# Patient Record
Sex: FEMALE | Race: ASIAN | NOT HISPANIC OR LATINO | URBAN - METROPOLITAN AREA
[De-identification: names, ages, dates, MRNs, and addresses within clinical notes are randomized per-mention and may not be internally consistent; named-entity substitution may affect disease eponyms.]

---

## 2018-04-24 ENCOUNTER — EMERGENCY (EMERGENCY)
Facility: HOSPITAL | Age: 33
LOS: 1 days | Discharge: HOME | End: 2018-04-24
Attending: EMERGENCY MEDICINE | Admitting: EMERGENCY MEDICINE
Payer: COMMERCIAL

## 2018-04-24 VITALS
HEART RATE: 75 BPM | OXYGEN SATURATION: 98 % | RESPIRATION RATE: 18 BRPM | SYSTOLIC BLOOD PRESSURE: 124 MMHG | WEIGHT: 125 LBS | TEMPERATURE: 98 F | DIASTOLIC BLOOD PRESSURE: 79 MMHG
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
## 2018-04-24 DIAGNOSIS — Z23 ENCOUNTER FOR IMMUNIZATION: ICD-10-CM

## 2018-04-24 DIAGNOSIS — S81.851A OPEN BITE, RIGHT LOWER LEG, INITIAL ENCOUNTER: ICD-10-CM
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
## 2018-04-24 DIAGNOSIS — Z20.3 CONTACT WITH AND (SUSPECTED) EXPOSURE TO RABIES: ICD-10-CM

## 2018-04-24 PROCEDURE — 99283 EMERGENCY DEPT VISIT LOW MDM: CPT

## 2018-04-24 RX ORDER — TETANUS TOXOID, REDUCED DIPHTHERIA TOXOID AND ACELLULAR PERTUSSIS VACCINE, ADSORBED 5; 2.5; 8; 8; 2.5 [IU]/.5ML; [IU]/.5ML; UG/.5ML; UG/.5ML; UG/.5ML
0.5 SUSPENSION INTRAMUSCULAR ONCE
Qty: 0 | Refills: 0 | Status: COMPLETED | OUTPATIENT
Start: 2018-04-24 | End: 2018-04-24

## 2018-04-24 RX ORDER — RABIES VACC, HUMAN DIPLOID/PF 2.5 UNIT
1 VIAL (EA) INTRAMUSCULAR ONCE
Qty: 0 | Refills: 0 | Status: COMPLETED | OUTPATIENT
Start: 2018-04-24 | End: 2018-04-24

## 2018-04-24 RX ORDER — TETANUS AND DIPHTHERIA TOXOIDS ADSORBED 2; 2 [LF]/.5ML; [LF]/.5ML
0.5 INJECTION INTRAMUSCULAR ONCE
Qty: 0 | Refills: 0 | Status: DISCONTINUED | OUTPATIENT
Start: 2018-04-24 | End: 2018-04-24

## 2018-04-24 RX ORDER — HUMAN RABIES VIRUS IMMUNE GLOBULIN 150 [IU]/ML
1100 INJECTION, SOLUTION INTRAMUSCULAR ONCE
Qty: 0 | Refills: 0 | Status: COMPLETED | OUTPATIENT
Start: 2018-04-24 | End: 2018-04-24

## 2018-04-24 RX ADMIN — Medication 1 MILLILITER(S): at 14:47

## 2018-04-24 RX ADMIN — HUMAN RABIES VIRUS IMMUNE GLOBULIN 1100 UNIT(S): 150 INJECTION, SOLUTION INTRAMUSCULAR at 14:47

## 2018-04-24 NOTE — ED PROVIDER NOTE - MUSCULOSKELETAL, MLM
Spine appears normal, range of motion is not limited, no muscle or joint tenderness RLE: R lower medial proximal calf w/ 2 pncture wounds, no overlying cellulitis, no hematoma, no crepitus, otherwise skin intact, knee w/ FROM.

## 2018-04-24 NOTE — ED PROVIDER NOTE - MEDICAL DECISION MAKING DETAILS
Tetanus is up to date, rabies immunoglobulin w/ rabies vaccine with f/u visits in 3, 7, and 14 days.

## 2018-04-24 NOTE — ED PROVIDER NOTE - OBJECTIVE STATEMENT
34 y/o F w/ no PMH, ANTHONY, who was at the dog park with her dog, and her dog was playing with another large dog when the dog ran into her R lower leg. Patient did not have pain at the time and walked home with her dog, and at home, patient noticed stingy pain and pulled up her jeans and noticed a bite marti to her R calf, there were rips in her jeans. Patient believes that when the dog may have bit her accidently while playing with her dog. She did not think to obtain the dog's owners ID or information on the canine as the patient did not notice any injury at the time. And because she does not know the dog's immunization records, patient is here requesting rabies vaccine. Patient denies bony pain to the area, bleeding from the wound, numbness/tingling or weakness to the RLE. She is originally from [place] and has been living in [place] for 5 years and had her immunization up to date including tetanus. Patient primarily speaks Mandarin. 34 y/o F w/ no PMH, ANTHONY, who was at the dog park with her dog, and her dog was playing with another large dog when the dog ran into her R lower leg. Patient did not have pain at the time and walked home with her dog, and at home, patient noticed stingy pain and pulled up her jeans and noticed a bite marti to her R calf, there were rips in her jeans. Patient believes that when the dog may have bit her accidently while playing with her dog. She did not think to obtain the dog's owners ID or information on the canine as the patient did not notice any injury at the time. And because she does not know the dog's immunization records, patient presents here with dog bite to R calf and is requesting rabies vaccine. Patient denies bony pain to the area, bleeding from the wound, numbness/tingling or weakness to the RLE. She is originally from [place] and has been living in [place] for 5 years and had her immunization up to date including tetanus. Patient primarily speaks Mandarin. 32 y/o F w/ no PMH, ANTHONY, who was at the dog park with her dog last night, and her dog was playing with another large dog when the dog ran into her R lower leg. Patient did not have pain at the time and walked home with her dog, and at home, patient noticed stingy pain and pulled up her jeans and noticed a bite marti to her R calf, there were rips in her jeans. Patient believes that when the dog may have bit her accidently while playing with her dog. She did not think to obtain the dog's owners ID or information on the canine as the patient did not notice any injury at the time. And because she does not know the dog's immunization records, patient presents here with dog bite to R calf and is requesting rabies vaccine. Patient denies bony pain to the area, bleeding from the wound, numbness/tingling or weakness to the RLE. She is originally from [place] and has been living in [place] for 5 years and had her immunization up to date including tetanus. Patient primarily speaks Mandarin. 34 y/o F w/ no PMH, ANTHONY, who was at the dog park with her dog last night, and her dog was playing with another large dog when the dog ran into her R lower leg. Patient did not have pain at the time and walked home with her dog, and at home, patient noticed stingy pain and pulled up her jeans and noticed a bite marti to her R calf, there were rips in her jeans. Patient believes that when the dog may have bit her accidently while playing with her dog. She did not think to obtain the dog's owners ID or information on the canine as the patient did not notice any injury at the time. And because she does not know the dog's immunization records, patient presents here with dog bite to R calf and is requesting rabies vaccine. Patient denies bony pain to the area, bleeding from the wound, numbness/tingling or weakness to the RLE. She is originally from [place] and has been living in [place] for 5 years and had her immunization up to date, last tetanus 2 years ago. Patient primarily speaks Mandarin.

## 2018-04-27 ENCOUNTER — EMERGENCY (EMERGENCY)
Facility: HOSPITAL | Age: 33
LOS: 1 days | Discharge: HOME | End: 2018-04-27
Attending: EMERGENCY MEDICINE | Admitting: EMERGENCY MEDICINE
Payer: COMMERCIAL

## 2018-04-27 VITALS
HEART RATE: 68 BPM | RESPIRATION RATE: 18 BRPM | DIASTOLIC BLOOD PRESSURE: 81 MMHG | SYSTOLIC BLOOD PRESSURE: 119 MMHG | OXYGEN SATURATION: 99 % | TEMPERATURE: 98 F

## 2018-04-27 DIAGNOSIS — Z20.3 CONTACT WITH AND (SUSPECTED) EXPOSURE TO RABIES: ICD-10-CM

## 2018-04-27 PROCEDURE — 99282 EMERGENCY DEPT VISIT SF MDM: CPT

## 2018-04-27 RX ORDER — RABIES VACC, HUMAN DIPLOID/PF 2.5 UNIT
1 VIAL (EA) INTRAMUSCULAR ONCE
Qty: 0 | Refills: 0 | Status: COMPLETED | OUTPATIENT
Start: 2018-04-27 | End: 2018-04-27

## 2018-04-27 RX ADMIN — Medication 1 MILLILITER(S): at 09:48

## 2018-04-27 NOTE — ED ADULT NURSE NOTE - CHIEF COMPLAINT QUOTE
pt arrives for 3rd day treatment rabies series pt arrives for 3rd day treatment rabies series for dog bite

## 2018-05-01 ENCOUNTER — EMERGENCY (EMERGENCY)
Facility: HOSPITAL | Age: 33
LOS: 1 days | Discharge: HOME | End: 2018-05-01
Admitting: EMERGENCY MEDICINE
Payer: COMMERCIAL

## 2018-05-01 VITALS
RESPIRATION RATE: 16 BRPM | HEART RATE: 71 BPM | DIASTOLIC BLOOD PRESSURE: 73 MMHG | TEMPERATURE: 98 F | OXYGEN SATURATION: 98 % | SYSTOLIC BLOOD PRESSURE: 107 MMHG

## 2018-05-01 PROCEDURE — 99282 EMERGENCY DEPT VISIT SF MDM: CPT

## 2018-05-01 RX ORDER — RABIES VACC, HUMAN DIPLOID/PF 2.5 UNIT
1 VIAL (EA) INTRAMUSCULAR ONCE
Qty: 0 | Refills: 0 | Status: COMPLETED | OUTPATIENT
Start: 2018-05-01 | End: 2018-05-01

## 2018-05-01 RX ADMIN — Medication 1 MILLILITER(S): at 10:04

## 2018-05-01 NOTE — ED PROVIDER NOTE - OBJECTIVE STATEMENT
32 y/o F w/ no PMH, NKDA, s/p bite to the R calf, here today for 3rd dose of rabies vaccine.  No active complaints.  Denies fever, chills, FB sensation, change in ROM/sensation, redness and swelling

## 2018-05-01 NOTE — ED ADULT NURSE NOTE - OBJECTIVE STATEMENT
pt is a 33 year old female who comes into the ED for her 3rd rabies vaccine. pt denies any symptoms at this time.

## 2018-05-01 NOTE — ED PROVIDER NOTE - PHYSICAL EXAMINATION
Gen - WDWN, NAD, comfortable and non-toxic appearing  Skin - warm, dry, intact   MS - w/w/p, no c/c/e, no CVAT b/l  Neuro - AxOx3, ambulatory without gait disturbance

## 2018-05-05 DIAGNOSIS — Z20.3 CONTACT WITH AND (SUSPECTED) EXPOSURE TO RABIES: ICD-10-CM

## 2018-05-08 ENCOUNTER — EMERGENCY (EMERGENCY)
Facility: HOSPITAL | Age: 33
LOS: 1 days | Discharge: HOME | End: 2018-05-08
Attending: EMERGENCY MEDICINE | Admitting: EMERGENCY MEDICINE
Payer: COMMERCIAL

## 2018-05-08 VITALS
DIASTOLIC BLOOD PRESSURE: 63 MMHG | HEART RATE: 62 BPM | SYSTOLIC BLOOD PRESSURE: 112 MMHG | TEMPERATURE: 98 F | RESPIRATION RATE: 17 BRPM | OXYGEN SATURATION: 96 %

## 2018-05-08 VITALS
HEART RATE: 59 BPM | WEIGHT: 138.01 LBS | SYSTOLIC BLOOD PRESSURE: 109 MMHG | RESPIRATION RATE: 17 BRPM | TEMPERATURE: 98 F | OXYGEN SATURATION: 97 % | DIASTOLIC BLOOD PRESSURE: 65 MMHG

## 2018-05-08 DIAGNOSIS — Z20.3 CONTACT WITH AND (SUSPECTED) EXPOSURE TO RABIES: ICD-10-CM

## 2018-05-08 PROCEDURE — 99282 EMERGENCY DEPT VISIT SF MDM: CPT

## 2018-05-08 RX ORDER — RABIES VACC, HUMAN DIPLOID/PF 2.5 UNIT
1 VIAL (EA) INTRAMUSCULAR ONCE
Qty: 0 | Refills: 0 | Status: COMPLETED | OUTPATIENT
Start: 2018-05-08 | End: 2018-05-08

## 2018-05-08 RX ADMIN — Medication 1 MILLILITER(S): at 11:22

## 2018-05-08 NOTE — ED ADULT NURSE NOTE - OBJECTIVE STATEMENT
pt is a 33 year old female who comes into the ED for her 4rd rabies vaccine. pt denies any symptoms at this time.

## 2018-05-08 NOTE — ED PROVIDER NOTE - MEDICAL DECISION MAKING DETAILS
given last dose of rabies vaccine.  Wound appears healed.  Conservative management discussed with the patient in detail.  Close PMD follow up encouraged.  Strict ED return instructions discussed in detail and patient given the opportunity to ask any questions about their discharge diagnosis and instructions

## 2018-05-08 NOTE — ED PROVIDER NOTE - OBJECTIVE STATEMENT
Patient is two weeks s/p dog bite.  Treated initially with rabies IG and vaccine and here for her last vaccine.  Denies pain at the site of the original dog bite.  Denies chest pain, cough, fever,

## 2018-08-28 ENCOUNTER — TRANSCRIPTION ENCOUNTER (OUTPATIENT)
Age: 33
End: 2018-08-28

## 2020-04-22 PROBLEM — Z00.00 ENCOUNTER FOR PREVENTIVE HEALTH EXAMINATION: Status: ACTIVE | Noted: 2020-04-22

## 2020-04-27 ENCOUNTER — APPOINTMENT (OUTPATIENT)
Dept: OBGYN | Facility: CLINIC | Age: 35
End: 2020-04-27
Payer: COMMERCIAL

## 2020-04-27 ENCOUNTER — TRANSCRIPTION ENCOUNTER (OUTPATIENT)
Age: 35
End: 2020-04-27

## 2020-04-27 ENCOUNTER — ASOB RESULT (OUTPATIENT)
Age: 35
End: 2020-04-27

## 2020-04-27 VITALS
DIASTOLIC BLOOD PRESSURE: 81 MMHG | HEIGHT: 63 IN | SYSTOLIC BLOOD PRESSURE: 114 MMHG | HEART RATE: 73 BPM | WEIGHT: 132.44 LBS | BODY MASS INDEX: 23.46 KG/M2 | TEMPERATURE: 98.2 F

## 2020-04-27 DIAGNOSIS — D12.6 BENIGN NEOPLASM OF COLON, UNSPECIFIED: ICD-10-CM

## 2020-04-27 DIAGNOSIS — Z83.71 FAMILY HISTORY OF COLONIC POLYPS: ICD-10-CM

## 2020-04-27 DIAGNOSIS — Z80.0 FAMILY HISTORY OF MALIGNANT NEOPLASM OF DIGESTIVE ORGANS: ICD-10-CM

## 2020-04-27 DIAGNOSIS — Z82.69 FAMILY HISTORY OF OTHER DISEASES OF THE MUSCULOSKELETAL SYSTEM AND CONNECTIVE TISSUE: ICD-10-CM

## 2020-04-27 LAB
HCG UR QL: POSITIVE
QUALITY CONTROL: YES

## 2020-04-27 PROCEDURE — 76817 TRANSVAGINAL US OBSTETRIC: CPT

## 2020-04-27 PROCEDURE — 99203 OFFICE O/P NEW LOW 30 MIN: CPT

## 2020-04-27 RX ORDER — ELASTIC BANDAGE 2"X2.2YD
BANDAGE TOPICAL
Refills: 0 | Status: ACTIVE | COMMUNITY

## 2020-04-27 NOTE — CHIEF COMPLAINT
[Initial Visit] : initial GYN visit [FreeTextEntry1] : Patient initially made appointment as NPN but started bleeding heavily on Saturday and has continued bleeding. UCg in office today is +.\par Patient under went IVF in 2/25 with egg retrieval but no embryos developed that could be transferred. 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 Since embryos did not develop, she decided to attempt pregnancy spontaneously and had positive pregnancy test on 4/14 but then started bleeding heavily two days ago and sonogram today shows no IUP.

## 2020-04-27 NOTE — PHYSICAL EXAM
[Awake] : awake [Acute Distress] : no acute distress [Alert] : alert [LAD] : no lymphadenopathy [Goiter] : no goiter [Thyroid Nodule] : no thyroid nodule [Mass] : no breast mass [Nipple Discharge] : no nipple discharge [Axillary LAD] : no axillary lymphadenopathy [Soft] : soft [Tender] : non tender [Oriented x3] : oriented to person, place, and time [Normal] : uterus [Uterine Adnexae] : were not tender and not enlarged [Moderate] : there was moderate vaginal bleeding

## 2020-04-27 NOTE — HISTORY OF PRESENT ILLNESS
[Approximately ___ (Month)] : the LMP was approximately [unfilled] month(s) ago [Regular Cycle Intervals] : periods have been regular

## 2020-04-30 LAB
ABO + RH PNL BLD: NORMAL
BASOPHILS # BLD AUTO: 0.05 K/UL
BASOPHILS NFR BLD AUTO: 0.6 %
BLD GP AB SCN SERPL QL: NORMAL
EOSINOPHIL # BLD AUTO: 0.25 K/UL
EOSINOPHIL NFR BLD AUTO: 3 %
HCG SERPL-MCNC: 1247 MIU/ML
HCT VFR BLD CALC: 40.7 %
HGB BLD-MCNC: 13.1 G/DL
IMM GRANULOCYTES NFR BLD AUTO: 0.2 %
LYMPHOCYTES # BLD AUTO: 1.96 K/UL
LYMPHOCYTES NFR BLD AUTO: 23.5 %
MAN DIFF?: NORMAL
MCHC RBC-ENTMCNC: 29.8 PG
MCHC RBC-ENTMCNC: 32.2 GM/DL
MCV RBC AUTO: 92.5 FL
MONOCYTES # BLD AUTO: 0.48 K/UL
MONOCYTES NFR BLD AUTO: 5.8 %
NEUTROPHILS # BLD AUTO: 5.58 K/UL
NEUTROPHILS NFR BLD AUTO: 66.9 %
PLATELET # BLD AUTO: 253 K/UL
RBC # BLD: 4.4 M/UL
RBC # FLD: 13.2 %
WBC # FLD AUTO: 8.34 K/UL

## 2020-05-06 LAB — HCG SERPL-MCNC: 38 MIU/ML

## 2020-06-12 ENCOUNTER — APPOINTMENT (OUTPATIENT)
Dept: OBGYN | Facility: CLINIC | Age: 35
End: 2020-06-12

## 2020-09-09 ENCOUNTER — APPOINTMENT (OUTPATIENT)
Dept: OPHTHALMOLOGY | Facility: CLINIC | Age: 35
End: 2020-09-09

## 2020-09-18 ENCOUNTER — APPOINTMENT (OUTPATIENT)
Dept: OTOLARYNGOLOGY | Facility: CLINIC | Age: 35
End: 2020-09-18
Payer: COMMERCIAL

## 2020-09-18 VITALS — BODY MASS INDEX: 21.62 KG/M2 | HEIGHT: 63 IN | WEIGHT: 122 LBS | TEMPERATURE: 98 F

## 2020-09-18 DIAGNOSIS — J30.0 VASOMOTOR RHINITIS: ICD-10-CM

## 2020-09-18 DIAGNOSIS — J32.4 CHRONIC PANSINUSITIS: ICD-10-CM

## 2020-09-18 DIAGNOSIS — Z82.49 FAMILY HISTORY OF ISCHEMIC HEART DISEASE AND OTHER DISEASES OF THE CIRCULATORY SYSTEM: ICD-10-CM

## 2020-09-18 DIAGNOSIS — Z80.9 FAMILY HISTORY OF MALIGNANT NEOPLASM, UNSPECIFIED: ICD-10-CM

## 2020-09-18 DIAGNOSIS — J30.2 OTHER SEASONAL ALLERGIC RHINITIS: ICD-10-CM

## 2020-09-18 PROCEDURE — 31231 NASAL ENDOSCOPY DX: CPT

## 2020-09-18 PROCEDURE — 69210 REMOVE IMPACTED EAR WAX UNI: CPT

## 2020-09-18 PROCEDURE — 99204 OFFICE O/P NEW MOD 45 MIN: CPT | Mod: 25

## 2020-09-18 NOTE — HISTORY OF PRESENT ILLNESS
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

## 2020-09-18 NOTE — PROCEDURE
[FreeTextEntry3] : Procedure- removal of cerumen bilaterally\par Diagnosis - cerumen impaction\par bilateral ears found to have impacted cerumen - they were cleared with suction and curette, canals appeared normal.\par  [FreeTextEntry6] : Procedure performed: Nasal Endoscopy- Diagnostic\par Pre-op indication(s): nasal congestion\par Post-op indication(s): nasal congestion \par Verbal and/or written consent obtained from patient\par Anterior rhinoscopy insufficient to account for symptoms\par Scope #: 3,  flexible fiber optic telescope \par The scope was introduced in the nasal passage between the middle and inferior turbinates to exam the inferior portion of the middle meatus and the fontanelle, as well as the maxillary ostia.  Next, the scope was passed medically and posteriorly to the middle turbinates to examine the sphenoethmoid recess and the superior turbinate region.\par Upon visualization the finders are as follows:\par Nasal Septum: sigmoidal septal deviation\par Bilateral - Mucosa: boggy turbinates, Mucous: scant, Polyp: not seen, Inferior Turbinate: boggy, Middle Turbinate: normal, Superior Turbinate: normal, Inferior Meatus: narrow, Middle Meatus: narrow, Super Meatus:normal, Sphenoethmoidal Recess: clear\par

## 2020-09-18 NOTE — REVIEW OF SYSTEMS
[Sneezing] : sneezing [Seasonal Allergies] : seasonal allergies [Ear Pain] : ear pain [Ear Itch] : ear itch [Hearing Loss] : hearing loss [Nasal Congestion] : nasal congestion [Sense Of Smell Problem] : sense of smell problem [Negative] : Heme/Lymph [FreeTextEntry1] : fatigue

## 2020-09-18 NOTE — REASON FOR VISIT
[Initial Consultation] : an initial consultation for [FreeTextEntry2] : allergy symptoms worsen through years

## 2020-09-18 NOTE — ASSESSMENT
[FreeTextEntry1] : pt with chronic sinusitis with most prominent sx being d/c, sneezinga nd pressure. rather benign appearing nasa exam. history neg allergy test \par We will proceed with a regiment of decongestants, antibiotics and irrigation to try to break the cycle of inflation and obstruction. this will treat the acute symptoms and will hopefully allow for maintenance therapy to reach disease areas and avoid further intervention.\par atrovent for d/c\par \par Hearing loss - likely conductive resolved after disimpaction \par if persists will cosndier audio\par ear hygiene\par discussed preventive measures and signs of accumulation\par

## 2020-12-17 ENCOUNTER — RX RENEWAL (OUTPATIENT)
Age: 35
End: 2020-12-17

## 2020-12-24 ENCOUNTER — NON-APPOINTMENT (OUTPATIENT)
Age: 35
End: 2020-12-24

## 2020-12-24 ENCOUNTER — ASOB RESULT (OUTPATIENT)
Age: 35
End: 2020-12-24

## 2020-12-24 ENCOUNTER — APPOINTMENT (OUTPATIENT)
Dept: OBGYN | Facility: CLINIC | Age: 35
End: 2020-12-24
Payer: COMMERCIAL

## 2020-12-24 PROCEDURE — 99072 ADDL SUPL MATRL&STAF TM PHE: CPT

## 2020-12-24 PROCEDURE — 76817 TRANSVAGINAL US OBSTETRIC: CPT

## 2020-12-28 ENCOUNTER — APPOINTMENT (OUTPATIENT)
Dept: OBGYN | Facility: CLINIC | Age: 35
End: 2020-12-28
Payer: COMMERCIAL

## 2020-12-28 ENCOUNTER — ASOB RESULT (OUTPATIENT)
Age: 35
End: 2020-12-28

## 2020-12-28 VITALS
HEART RATE: 76 BPM | SYSTOLIC BLOOD PRESSURE: 116 MMHG | BODY MASS INDEX: 22.32 KG/M2 | DIASTOLIC BLOOD PRESSURE: 76 MMHG | TEMPERATURE: 98.3 F | WEIGHT: 126 LBS | HEIGHT: 63 IN

## 2020-12-28 DIAGNOSIS — Z87.59 PERSONAL HISTORY OF OTHER COMPLICATIONS OF PREGNANCY, CHILDBIRTH AND THE PUERPERIUM: ICD-10-CM

## 2020-12-28 PROCEDURE — 99072 ADDL SUPL MATRL&STAF TM PHE: CPT

## 2020-12-28 PROCEDURE — 99214 OFFICE O/P EST MOD 30 MIN: CPT

## 2020-12-28 RX ORDER — PREDNISONE 10 MG/1
10 TABLET ORAL
Qty: 27 | Refills: 0 | Status: DISCONTINUED | COMMUNITY
Start: 2020-09-18 | End: 2020-12-28

## 2020-12-28 RX ORDER — AMOXICILLIN AND CLAVULANATE POTASSIUM 875; 125 MG/1; MG/1
875-125 TABLET, COATED ORAL TWICE DAILY
Qty: 28 | Refills: 0 | Status: DISCONTINUED | COMMUNITY
Start: 2020-09-18 | End: 2020-12-28

## 2020-12-30 LAB
ABO + RH PNL BLD: NORMAL
B19V IGG SER QL IA: 0.4 INDEX
B19V IGG+IGM SER-IMP: NEGATIVE
B19V IGG+IGM SER-IMP: NORMAL
B19V IGM FLD-ACNC: 0.3 INDEX
B19V IGM SER-ACNC: NEGATIVE
BACTERIA UR CULT: NORMAL
BASOPHILS # BLD AUTO: 0.03 K/UL
BASOPHILS NFR BLD AUTO: 0.4 %
BLD GP AB SCN SERPL QL: NORMAL
C TRACH RRNA SPEC QL NAA+PROBE: NOT DETECTED
EOSINOPHIL # BLD AUTO: 0.27 K/UL
EOSINOPHIL NFR BLD AUTO: 3.3 %
HBV SURFACE AG SER QL: NONREACTIVE
HCT VFR BLD CALC: 36.3 %
HCV AB SER QL: NONREACTIVE
HCV S/CO RATIO: 0.22 S/CO
HGB A MFR BLD: 97.5 %
HGB A2 MFR BLD: 2.5 %
HGB BLD-MCNC: 12.2 G/DL
HGB FRACT BLD-IMP: NORMAL
HIV1+2 AB SPEC QL IA.RAPID: NONREACTIVE
HPV HIGH+LOW RISK DNA PNL CVX: NOT DETECTED
IMM GRANULOCYTES NFR BLD AUTO: 0.2 %
LEAD BLD-MCNC: <1 UG/DL
LYMPHOCYTES # BLD AUTO: 1.82 K/UL
LYMPHOCYTES NFR BLD AUTO: 22.4 %
MAN DIFF?: NORMAL
MCHC RBC-ENTMCNC: 31.2 PG
MCHC RBC-ENTMCNC: 33.6 GM/DL
MCV RBC AUTO: 92.8 FL
MEV IGG FLD QL IA: >300 AU/ML
MEV IGG+IGM SER-IMP: POSITIVE
MONOCYTES # BLD AUTO: 0.46 K/UL
MONOCYTES NFR BLD AUTO: 5.7 %
N GONORRHOEA RRNA SPEC QL NAA+PROBE: NOT DETECTED
NEUTROPHILS # BLD AUTO: 5.51 K/UL
NEUTROPHILS NFR BLD AUTO: 68 %
PLATELET # BLD AUTO: 227 K/UL
RBC # BLD: 3.91 M/UL
RBC # FLD: 12.1 %
RUBV IGG FLD-ACNC: 5.6 INDEX
RUBV IGG SER-IMP: POSITIVE
SOURCE AMPLIFICATION: NORMAL
T GONDII AB SER-IMP: NEGATIVE
T GONDII AB SER-IMP: NEGATIVE
T GONDII IGG SER QL: <3 IU/ML
T GONDII IGM SER QL: <3 AU/ML
T PALLIDUM AB SER QL IA: NEGATIVE
VZV AB TITR SER: POSITIVE
VZV IGG SER IF-ACNC: >4000 INDEX
WBC # FLD AUTO: 8.11 K/UL

## 2021-01-06 LAB — CYTOLOGY CVX/VAG DOC THIN PREP: NORMAL

## 2021-01-25 ENCOUNTER — APPOINTMENT (OUTPATIENT)
Dept: OBGYN | Facility: CLINIC | Age: 36
End: 2021-01-25
Payer: COMMERCIAL

## 2021-01-25 ENCOUNTER — NON-APPOINTMENT (OUTPATIENT)
Age: 36
End: 2021-01-25

## 2021-01-25 VITALS
DIASTOLIC BLOOD PRESSURE: 71 MMHG | SYSTOLIC BLOOD PRESSURE: 115 MMHG | WEIGHT: 129.2 LBS | BODY MASS INDEX: 22.89 KG/M2 | HEIGHT: 63 IN

## 2021-01-25 PROCEDURE — 0501F PRENATAL FLOW SHEET: CPT

## 2021-01-28 ENCOUNTER — NON-APPOINTMENT (OUTPATIENT)
Age: 36
End: 2021-01-28

## 2021-02-12 ENCOUNTER — APPOINTMENT (OUTPATIENT)
Dept: ANTEPARTUM | Facility: CLINIC | Age: 36
End: 2021-02-12
Payer: COMMERCIAL

## 2021-02-12 ENCOUNTER — LABORATORY RESULT (OUTPATIENT)
Age: 36
End: 2021-02-12

## 2021-02-12 ENCOUNTER — ASOB RESULT (OUTPATIENT)
Age: 36
End: 2021-02-12

## 2021-02-12 PROCEDURE — 36416 COLLJ CAPILLARY BLOOD SPEC: CPT

## 2021-02-12 PROCEDURE — 76801 OB US < 14 WKS SINGLE FETUS: CPT

## 2021-02-12 PROCEDURE — 99072 ADDL SUPL MATRL&STAF TM PHE: CPT

## 2021-02-12 PROCEDURE — 76813 OB US NUCHAL MEAS 1 GEST: CPT

## 2021-03-01 ENCOUNTER — NON-APPOINTMENT (OUTPATIENT)
Age: 36
End: 2021-03-01

## 2021-03-01 ENCOUNTER — APPOINTMENT (OUTPATIENT)
Dept: OBGYN | Facility: CLINIC | Age: 36
End: 2021-03-01
Payer: COMMERCIAL

## 2021-03-01 ENCOUNTER — LABORATORY RESULT (OUTPATIENT)
Age: 36
End: 2021-03-01

## 2021-03-01 VITALS
TEMPERATURE: 97.3 F | SYSTOLIC BLOOD PRESSURE: 126 MMHG | DIASTOLIC BLOOD PRESSURE: 79 MMHG | HEIGHT: 63 IN | WEIGHT: 136 LBS | BODY MASS INDEX: 24.1 KG/M2

## 2021-03-01 PROCEDURE — 0502F SUBSEQUENT PRENATAL CARE: CPT

## 2021-03-31 ENCOUNTER — NON-APPOINTMENT (OUTPATIENT)
Age: 36
End: 2021-03-31

## 2021-03-31 ENCOUNTER — APPOINTMENT (OUTPATIENT)
Dept: OBGYN | Facility: CLINIC | Age: 36
End: 2021-03-31
Payer: COMMERCIAL

## 2021-03-31 VITALS
TEMPERATURE: 97.2 F | WEIGHT: 143.06 LBS | HEIGHT: 63 IN | BODY MASS INDEX: 25.35 KG/M2 | SYSTOLIC BLOOD PRESSURE: 113 MMHG | DIASTOLIC BLOOD PRESSURE: 71 MMHG

## 2021-03-31 PROCEDURE — 0502F SUBSEQUENT PRENATAL CARE: CPT

## 2021-04-05 ENCOUNTER — TRANSCRIPTION ENCOUNTER (OUTPATIENT)
Age: 36
End: 2021-04-05

## 2021-04-05 ENCOUNTER — ASOB RESULT (OUTPATIENT)
Age: 36
End: 2021-04-05

## 2021-04-05 ENCOUNTER — APPOINTMENT (OUTPATIENT)
Dept: ANTEPARTUM | Facility: CLINIC | Age: 36
End: 2021-04-05
Payer: COMMERCIAL

## 2021-04-05 PROCEDURE — 76811 OB US DETAILED SNGL FETUS: CPT

## 2021-04-05 PROCEDURE — 99072 ADDL SUPL MATRL&STAF TM PHE: CPT

## 2021-05-03 ENCOUNTER — NON-APPOINTMENT (OUTPATIENT)
Age: 36
End: 2021-05-03

## 2021-05-03 ENCOUNTER — APPOINTMENT (OUTPATIENT)
Dept: OBGYN | Facility: CLINIC | Age: 36
End: 2021-05-03
Payer: COMMERCIAL

## 2021-05-03 VITALS
WEIGHT: 145 LBS | DIASTOLIC BLOOD PRESSURE: 70 MMHG | BODY MASS INDEX: 25.69 KG/M2 | SYSTOLIC BLOOD PRESSURE: 115 MMHG | HEIGHT: 63 IN | TEMPERATURE: 96.4 F

## 2021-05-03 VITALS — HEIGHT: 63 IN

## 2021-05-03 LAB
BASOPHILS # BLD AUTO: 0.04 K/UL
BASOPHILS NFR BLD AUTO: 0.3 %
EOSINOPHIL # BLD AUTO: 0.31 K/UL
EOSINOPHIL NFR BLD AUTO: 2.5 %
GLUCOSE 1H P 50 G GLC PO SERPL-MCNC: 130 MG/DL
HCT VFR BLD CALC: 33.3 %
HGB BLD-MCNC: 11 G/DL
IMM GRANULOCYTES NFR BLD AUTO: 1 %
LYMPHOCYTES # BLD AUTO: 2.25 K/UL
LYMPHOCYTES NFR BLD AUTO: 17.8 %
MAN DIFF?: NORMAL
MCHC RBC-ENTMCNC: 31.7 PG
MCHC RBC-ENTMCNC: 33 GM/DL
MCV RBC AUTO: 96 FL
MONOCYTES # BLD AUTO: 0.65 K/UL
MONOCYTES NFR BLD AUTO: 5.2 %
NEUTROPHILS # BLD AUTO: 9.24 K/UL
NEUTROPHILS NFR BLD AUTO: 73.2 %
PLATELET # BLD AUTO: 261 K/UL
RBC # BLD: 3.47 M/UL
RBC # FLD: 13.2 %
T PALLIDUM AB SER QL IA: NEGATIVE
WBC # FLD AUTO: 12.62 K/UL

## 2021-05-03 PROCEDURE — 0502F SUBSEQUENT PRENATAL CARE: CPT

## 2021-05-13 ENCOUNTER — NON-APPOINTMENT (OUTPATIENT)
Age: 36
End: 2021-05-13

## 2021-05-24 ENCOUNTER — NON-APPOINTMENT (OUTPATIENT)
Age: 36
End: 2021-05-24

## 2021-05-24 ENCOUNTER — APPOINTMENT (OUTPATIENT)
Dept: OBGYN | Facility: CLINIC | Age: 36
End: 2021-05-24
Payer: COMMERCIAL

## 2021-05-24 VITALS
SYSTOLIC BLOOD PRESSURE: 113 MMHG | HEIGHT: 63 IN | DIASTOLIC BLOOD PRESSURE: 73 MMHG | BODY MASS INDEX: 26.67 KG/M2 | WEIGHT: 150.5 LBS

## 2021-05-24 PROCEDURE — 0502F SUBSEQUENT PRENATAL CARE: CPT

## 2021-06-14 ENCOUNTER — NON-APPOINTMENT (OUTPATIENT)
Age: 36
End: 2021-06-14

## 2021-06-14 ENCOUNTER — APPOINTMENT (OUTPATIENT)
Dept: OBGYN | Facility: CLINIC | Age: 36
End: 2021-06-14
Payer: COMMERCIAL

## 2021-06-14 VITALS
SYSTOLIC BLOOD PRESSURE: 113 MMHG | HEART RATE: 92 BPM | DIASTOLIC BLOOD PRESSURE: 76 MMHG | WEIGHT: 153.19 LBS | TEMPERATURE: 96.6 F | BODY MASS INDEX: 27.14 KG/M2 | HEIGHT: 63 IN

## 2021-06-14 PROCEDURE — 0502F SUBSEQUENT PRENATAL CARE: CPT

## 2021-06-30 ENCOUNTER — NON-APPOINTMENT (OUTPATIENT)
Age: 36
End: 2021-06-30

## 2021-06-30 ENCOUNTER — APPOINTMENT (OUTPATIENT)
Dept: ANTEPARTUM | Facility: CLINIC | Age: 36
End: 2021-06-30
Payer: COMMERCIAL

## 2021-06-30 ENCOUNTER — APPOINTMENT (OUTPATIENT)
Dept: ULTRASOUND IMAGING | Facility: CLINIC | Age: 36
End: 2021-06-30
Payer: COMMERCIAL

## 2021-06-30 ENCOUNTER — ASOB RESULT (OUTPATIENT)
Age: 36
End: 2021-06-30

## 2021-06-30 ENCOUNTER — APPOINTMENT (OUTPATIENT)
Dept: OBGYN | Facility: CLINIC | Age: 36
End: 2021-06-30
Payer: COMMERCIAL

## 2021-06-30 ENCOUNTER — OUTPATIENT (OUTPATIENT)
Dept: OUTPATIENT SERVICES | Facility: HOSPITAL | Age: 36
LOS: 1 days | End: 2021-06-30
Payer: COMMERCIAL

## 2021-06-30 VITALS
BODY MASS INDEX: 27.64 KG/M2 | DIASTOLIC BLOOD PRESSURE: 72 MMHG | HEART RATE: 82 BPM | SYSTOLIC BLOOD PRESSURE: 108 MMHG | HEIGHT: 63 IN | WEIGHT: 156 LBS

## 2021-06-30 DIAGNOSIS — M79.606 PAIN IN LEG, UNSPECIFIED: ICD-10-CM

## 2021-06-30 DIAGNOSIS — Z23 ENCOUNTER FOR IMMUNIZATION: ICD-10-CM

## 2021-06-30 PROCEDURE — 76819 FETAL BIOPHYS PROFIL W/O NST: CPT

## 2021-06-30 PROCEDURE — 76816 OB US FOLLOW-UP PER FETUS: CPT

## 2021-06-30 PROCEDURE — 90715 TDAP VACCINE 7 YRS/> IM: CPT

## 2021-06-30 PROCEDURE — 93970 EXTREMITY STUDY: CPT | Mod: 26

## 2021-06-30 PROCEDURE — 93970 EXTREMITY STUDY: CPT

## 2021-06-30 PROCEDURE — 0502F SUBSEQUENT PRENATAL CARE: CPT

## 2021-06-30 PROCEDURE — 99072 ADDL SUPL MATRL&STAF TM PHE: CPT

## 2021-06-30 PROCEDURE — 90471 IMMUNIZATION ADMIN: CPT

## 2021-07-01 ENCOUNTER — NON-APPOINTMENT (OUTPATIENT)
Age: 36
End: 2021-07-01

## 2021-07-02 ENCOUNTER — NON-APPOINTMENT (OUTPATIENT)
Age: 36
End: 2021-07-02

## 2021-07-06 ENCOUNTER — NON-APPOINTMENT (OUTPATIENT)
Age: 36
End: 2021-07-06

## 2021-07-12 ENCOUNTER — NON-APPOINTMENT (OUTPATIENT)
Age: 36
End: 2021-07-12

## 2021-07-12 ENCOUNTER — APPOINTMENT (OUTPATIENT)
Dept: OBGYN | Facility: CLINIC | Age: 36
End: 2021-07-12
Payer: COMMERCIAL

## 2021-07-12 VITALS
BODY MASS INDEX: 27.82 KG/M2 | SYSTOLIC BLOOD PRESSURE: 113 MMHG | WEIGHT: 157 LBS | DIASTOLIC BLOOD PRESSURE: 76 MMHG | HEART RATE: 91 BPM | HEIGHT: 63 IN

## 2021-07-12 PROCEDURE — 0502F SUBSEQUENT PRENATAL CARE: CPT

## 2021-07-26 ENCOUNTER — NON-APPOINTMENT (OUTPATIENT)
Age: 36
End: 2021-07-26

## 2021-07-26 ENCOUNTER — APPOINTMENT (OUTPATIENT)
Dept: OBGYN | Facility: CLINIC | Age: 36
End: 2021-07-26
Payer: COMMERCIAL

## 2021-07-26 VITALS
TEMPERATURE: 97.3 F | SYSTOLIC BLOOD PRESSURE: 118 MMHG | WEIGHT: 160 LBS | HEIGHT: 63 IN | DIASTOLIC BLOOD PRESSURE: 76 MMHG | BODY MASS INDEX: 28.35 KG/M2

## 2021-07-26 PROCEDURE — 0502F SUBSEQUENT PRENATAL CARE: CPT

## 2021-08-02 ENCOUNTER — APPOINTMENT (OUTPATIENT)
Dept: OBGYN | Facility: CLINIC | Age: 36
End: 2021-08-02
Payer: COMMERCIAL

## 2021-08-02 VITALS
TEMPERATURE: 96.9 F | SYSTOLIC BLOOD PRESSURE: 123 MMHG | WEIGHT: 160 LBS | DIASTOLIC BLOOD PRESSURE: 80 MMHG | HEIGHT: 63 IN | BODY MASS INDEX: 28.35 KG/M2

## 2021-08-02 PROCEDURE — 0502F SUBSEQUENT PRENATAL CARE: CPT

## 2021-08-04 ENCOUNTER — APPOINTMENT (OUTPATIENT)
Dept: ANTEPARTUM | Facility: CLINIC | Age: 36
End: 2021-08-04
Payer: COMMERCIAL

## 2021-08-04 ENCOUNTER — ASOB RESULT (OUTPATIENT)
Age: 36
End: 2021-08-04

## 2021-08-04 PROCEDURE — 76816 OB US FOLLOW-UP PER FETUS: CPT

## 2021-08-04 PROCEDURE — 76819 FETAL BIOPHYS PROFIL W/O NST: CPT

## 2021-08-09 ENCOUNTER — APPOINTMENT (OUTPATIENT)
Dept: OBGYN | Facility: CLINIC | Age: 36
End: 2021-08-09
Payer: COMMERCIAL

## 2021-08-09 VITALS
DIASTOLIC BLOOD PRESSURE: 81 MMHG | WEIGHT: 164 LBS | BODY MASS INDEX: 29.06 KG/M2 | SYSTOLIC BLOOD PRESSURE: 122 MMHG | HEIGHT: 63 IN | TEMPERATURE: 95.7 F | HEART RATE: 72 BPM

## 2021-08-09 PROCEDURE — 0502F SUBSEQUENT PRENATAL CARE: CPT

## 2021-08-16 ENCOUNTER — OUTPATIENT (OUTPATIENT)
Dept: INPATIENT UNIT | Facility: HOSPITAL | Age: 36
LOS: 1 days | Discharge: HOME | End: 2021-08-16
Payer: COMMERCIAL

## 2021-08-16 ENCOUNTER — APPOINTMENT (OUTPATIENT)
Dept: OBGYN | Facility: CLINIC | Age: 36
End: 2021-08-16

## 2021-08-16 ENCOUNTER — APPOINTMENT (OUTPATIENT)
Dept: OBGYN | Facility: CLINIC | Age: 36
End: 2021-08-16
Payer: COMMERCIAL

## 2021-08-16 VITALS
BODY MASS INDEX: 29.1 KG/M2 | DIASTOLIC BLOOD PRESSURE: 82 MMHG | HEIGHT: 63 IN | SYSTOLIC BLOOD PRESSURE: 128 MMHG | WEIGHT: 164.25 LBS | TEMPERATURE: 95 F

## 2021-08-16 VITALS — HEART RATE: 77 BPM | SYSTOLIC BLOOD PRESSURE: 118 MMHG | DIASTOLIC BLOOD PRESSURE: 69 MMHG

## 2021-08-16 VITALS — DIASTOLIC BLOOD PRESSURE: 80 MMHG | SYSTOLIC BLOOD PRESSURE: 122 MMHG | HEART RATE: 77 BPM

## 2021-08-16 DIAGNOSIS — O26.899 OTHER SPECIFIED PREGNANCY RELATED CONDITIONS, UNSPECIFIED TRIMESTER: ICD-10-CM

## 2021-08-16 DIAGNOSIS — Z3A.00 WEEKS OF GESTATION OF PREGNANCY NOT SPECIFIED: ICD-10-CM

## 2021-08-16 DIAGNOSIS — Z98.890 OTHER SPECIFIED POSTPROCEDURAL STATES: Chronic | ICD-10-CM

## 2021-08-16 PROCEDURE — 0502F SUBSEQUENT PRENATAL CARE: CPT

## 2021-08-16 PROCEDURE — 99213 OFFICE O/P EST LOW 20 MIN: CPT

## 2021-08-16 RX ORDER — FLUTICASONE PROPIONATE 50 UG/1
50 SPRAY, METERED NASAL DAILY
Qty: 1 | Refills: 3 | Status: DISCONTINUED | COMMUNITY
Start: 2020-09-18 | End: 2021-08-16

## 2021-08-16 RX ORDER — IPRATROPIUM BROMIDE 21 UG/1
0.03 SPRAY NASAL 3 TIMES DAILY
Qty: 3 | Refills: 3 | Status: DISCONTINUED | COMMUNITY
Start: 2020-09-18 | End: 2021-08-16

## 2021-08-16 RX ORDER — OXYMETAZOLINE HCL 0.05 %
0.05 SPRAY, NON-AEROSOL (ML) NASAL
Qty: 1 | Refills: 0 | Status: DISCONTINUED | COMMUNITY
Start: 2020-09-18 | End: 2021-08-16

## 2021-08-16 NOTE — OB PROVIDER TRIAGE NOTE - NSHPPHYSICALEXAM_GEN_ALL_CORE
LS clear bilaterally  abd soft gravid NT  CV RRR  TAS:  vertex  anterior placenta  BPP 8/8  ROULA: 9.5  FHT: moderate variability, + accels, + variables  toco: occasional  Vital Signs Last 24 Hrs  T(C): 37.1 (16 Aug 2021 01:00), Max: 37.1 (16 Aug 2021 01:00)  T(F): 98.8 (16 Aug 2021 01:00), Max: 98.8 (16 Aug 2021 01:00)  HR: 77 (16 Aug 2021 01:00) (77 - 77)  BP: 122/80 (16 Aug 2021 01:00) (122/80 - 122/80)  BP(mean): --  RR: 18 (16 Aug 2021 01:00) (18 - 18)  SpO2: -- LS clear bilaterally  abd soft gravid NT  CV RRR  TAS:  vertex  anterior placenta  BPP 8/8  ROULA: 9.5  FHT: moderate variability, baseline 120,+ accels, tracing reviewed with resident Kurt and Byorn  toco: occasional contraction- no pain  Vital Signs Last 24 Hrs  T(C): 37.1 (16 Aug 2021 01:00), Max: 37.1 (16 Aug 2021 01:00)  T(F): 98.8 (16 Aug 2021 01:00), Max: 98.8 (16 Aug 2021 01:00)  HR: 77 (16 Aug 2021 01:00) (77 - 77)  BP: 122/80 (16 Aug 2021 01:00) (122/80 - 122/80)  BP(mean): --  RR: 18 (16 Aug 2021 01:00) (18 - 18)  SpO2: --

## 2021-08-16 NOTE — OB PROVIDER TRIAGE NOTE - HISTORY OF PRESENT ILLNESS
37 yo  female  @ 39.1 wks reports decreased fetal movement since 12noon, not paying attention but felt occasional movement weaker than usual. denies vb or lof, reports occasional shavonne dan. AP course uncomplicated thus far. denies fever chills ha n/v/d new swelling vision changes epigastric pain cp sob or cough. last saw OB Monday, EFW 2 wks ago 6#4.    GBS: negative  meds: PNV  All: denies  PMH: denies  PSH: breast sx , colonoscopic polypectomy 2020  gyn hx: denies  ob hx: MAB 2020 no sx

## 2021-08-16 NOTE — OB PROVIDER TRIAGE NOTE - PLAN OF CARE
d/w Dr Abbasi discharge home resolved decreased fetal movement @ 39.1 wks  maternal and fetal surveillance reassuring  rest activity as tolerated  increase water intake  keep all OB appointments- today at 1015am  return for vaginal bleeding leaking of fluid decreased fetal movement or any concerns  labor precautions instructed  v/w discharge instructions given with verbal understanding by patient

## 2021-08-16 NOTE — OB RN TRIAGE NOTE - NSICDXPASTSURGICALHX_GEN_ALL_CORE_FT
PAST SURGICAL HISTORY:  H/O breast surgery 2017, removal of benign tissue, right side    S/P colonoscopic polypectomy 2020

## 2021-08-19 LAB
GP B STREP DNA SPEC QL NAA+PROBE: NORMAL
GP B STREP DNA SPEC QL NAA+PROBE: NOT DETECTED
SOURCE GBS: NORMAL

## 2021-08-22 ENCOUNTER — TRANSCRIPTION ENCOUNTER (OUTPATIENT)
Age: 36
End: 2021-08-22

## 2021-08-22 ENCOUNTER — INPATIENT (INPATIENT)
Facility: HOSPITAL | Age: 36
LOS: 1 days | Discharge: HOME | End: 2021-08-24
Attending: OBSTETRICS & GYNECOLOGY | Admitting: OBSTETRICS & GYNECOLOGY
Payer: COMMERCIAL

## 2021-08-22 ENCOUNTER — OUTPATIENT (OUTPATIENT)
Dept: OUTPATIENT SERVICES | Facility: HOSPITAL | Age: 36
LOS: 1 days | Discharge: HOME | End: 2021-08-22

## 2021-08-22 VITALS — SYSTOLIC BLOOD PRESSURE: 96 MMHG | DIASTOLIC BLOOD PRESSURE: 53 MMHG | HEART RATE: 75 BPM

## 2021-08-22 VITALS
RESPIRATION RATE: 16 BRPM | DIASTOLIC BLOOD PRESSURE: 79 MMHG | TEMPERATURE: 98 F | HEART RATE: 90 BPM | SYSTOLIC BLOOD PRESSURE: 119 MMHG

## 2021-08-22 VITALS
SYSTOLIC BLOOD PRESSURE: 105 MMHG | TEMPERATURE: 99 F | DIASTOLIC BLOOD PRESSURE: 63 MMHG | HEART RATE: 84 BPM | RESPIRATION RATE: 16 BRPM

## 2021-08-22 DIAGNOSIS — Z3A.00 WEEKS OF GESTATION OF PREGNANCY NOT SPECIFIED: ICD-10-CM

## 2021-08-22 DIAGNOSIS — Z98.890 OTHER SPECIFIED POSTPROCEDURAL STATES: Chronic | ICD-10-CM

## 2021-08-22 DIAGNOSIS — O26.899 OTHER SPECIFIED PREGNANCY RELATED CONDITIONS, UNSPECIFIED TRIMESTER: ICD-10-CM

## 2021-08-22 LAB
BASOPHILS # BLD AUTO: 0.03 K/UL — SIGNIFICANT CHANGE UP (ref 0–0.2)
BASOPHILS NFR BLD AUTO: 0.2 % — SIGNIFICANT CHANGE UP (ref 0–2)
BLD GP AB SCN SERPL QL: NEGATIVE — SIGNIFICANT CHANGE UP
EOSINOPHIL # BLD AUTO: 0.06 K/UL — SIGNIFICANT CHANGE UP (ref 0–0.5)
EOSINOPHIL NFR BLD AUTO: 0.3 % — SIGNIFICANT CHANGE UP (ref 0–6)
HCT VFR BLD CALC: 36.6 % — SIGNIFICANT CHANGE UP (ref 34.5–45)
HGB BLD-MCNC: 12.6 G/DL — SIGNIFICANT CHANGE UP (ref 11.5–15.5)
IANC: 14.93 K/UL — HIGH (ref 1.5–8.5)
IMM GRANULOCYTES NFR BLD AUTO: 0.9 % — SIGNIFICANT CHANGE UP (ref 0–1.5)
LYMPHOCYTES # BLD AUTO: 1.92 K/UL — SIGNIFICANT CHANGE UP (ref 1–3.3)
LYMPHOCYTES # BLD AUTO: 10.5 % — LOW (ref 13–44)
MCHC RBC-ENTMCNC: 32.2 PG — SIGNIFICANT CHANGE UP (ref 27–34)
MCHC RBC-ENTMCNC: 34.4 GM/DL — SIGNIFICANT CHANGE UP (ref 32–36)
MCV RBC AUTO: 93.6 FL — SIGNIFICANT CHANGE UP (ref 80–100)
MONOCYTES # BLD AUTO: 1.1 K/UL — HIGH (ref 0–0.9)
MONOCYTES NFR BLD AUTO: 6 % — SIGNIFICANT CHANGE UP (ref 2–14)
NEUTROPHILS # BLD AUTO: 14.93 K/UL — HIGH (ref 1.8–7.4)
NEUTROPHILS NFR BLD AUTO: 82.1 % — HIGH (ref 43–77)
NRBC # BLD: 0 /100 WBCS — SIGNIFICANT CHANGE UP
NRBC # FLD: 0 K/UL — SIGNIFICANT CHANGE UP
PLATELET # BLD AUTO: 244 K/UL — SIGNIFICANT CHANGE UP (ref 150–400)
RBC # BLD: 3.91 M/UL — SIGNIFICANT CHANGE UP (ref 3.8–5.2)
RBC # FLD: 13.4 % — SIGNIFICANT CHANGE UP (ref 10.3–14.5)
RH IG SCN BLD-IMP: POSITIVE — SIGNIFICANT CHANGE UP
RH IG SCN BLD-IMP: POSITIVE — SIGNIFICANT CHANGE UP
WBC # BLD: 18.21 K/UL — HIGH (ref 3.8–10.5)
WBC # FLD AUTO: 18.21 K/UL — HIGH (ref 3.8–10.5)

## 2021-08-22 RX ORDER — SODIUM CHLORIDE 9 MG/ML
1000 INJECTION, SOLUTION INTRAVENOUS
Refills: 0 | Status: DISCONTINUED | OUTPATIENT
Start: 2021-08-22 | End: 2021-08-23

## 2021-08-22 RX ORDER — OXYTOCIN 10 UNIT/ML
333.33 VIAL (ML) INJECTION
Qty: 20 | Refills: 0 | Status: DISCONTINUED | OUTPATIENT
Start: 2021-08-22 | End: 2021-08-23

## 2021-08-22 RX ORDER — OXYTOCIN 10 UNIT/ML
2 VIAL (ML) INJECTION
Qty: 30 | Refills: 0 | Status: DISCONTINUED | OUTPATIENT
Start: 2021-08-22 | End: 2021-08-24

## 2021-08-22 RX ADMIN — SODIUM CHLORIDE 125 MILLILITER(S): 9 INJECTION, SOLUTION INTRAVENOUS at 23:52

## 2021-08-22 RX ADMIN — Medication 2 MILLIUNIT(S)/MIN: at 23:52

## 2021-08-22 NOTE — OB PROVIDER TRIAGE NOTE - NSHPPHYSICALEXAM_GEN_ALL_CORE
Vital Signs Last 24 Hrs  T(C): 37 (22 Aug 2021 12:16), Max: 37 (22 Aug 2021 12:16)  T(F): 98.6 (22 Aug 2021 12:16), Max: 98.6 (22 Aug 2021 12:16)  HR: 84 (22 Aug 2021 12:16) (84 - 84)  BP: 105/63 (22 Aug 2021 12:16) (105/63 - 105/63)  BP(mean): --  RR: 16 (22 Aug 2021 12:16) (16 - 16)  SpO2: --    A7O x3  CTAB  abdomen: gravid, soft, nontender  sve 2.5/60/-1 Vital Signs Last 24 Hrs  T(C): 37 (22 Aug 2021 12:16), Max: 37 (22 Aug 2021 12:16)  T(F): 98.6 (22 Aug 2021 12:16), Max: 98.6 (22 Aug 2021 12:16)  HR: 84 (22 Aug 2021 12:16) (84 - 84)  BP: 105/63 (22 Aug 2021 12:16) (105/63 - 105/63)  BP(mean): --  RR: 16 (22 Aug 2021 12:16) (16 - 16)  SpO2: --    A7O x3  CTAB  abdomen: gravid, soft, nontender  sve 2.5/60/-1  TAS: vtx, anterior placenta, bpp 8/8, yamilet 7.45  nst: 120 baseline, moderate variability, positive accels, no decels, contractions q 10 minutes

## 2021-08-22 NOTE — OB RN TRIAGE NOTE - NSICDXPASTSURGICALHX_GEN_ALL_CORE_FT
PAST SURGICAL HISTORY:  H/O breast surgery 2017, removal of benign tissue, right side    S/P colonoscopic polypectomy every year last time 1/20

## 2021-08-22 NOTE — OB PROVIDER H&P - ASSESSMENT
37 y/o F  at 40.0 weeks admitted for Active labor   GBS negative 2021  d/w Dr. Prescott - pt to be AROM after epidural

## 2021-08-22 NOTE — OB PROVIDER H&P - HISTORY OF PRESENT ILLNESS
37 y/o F  at 40.0 weeks  presented  with complaints of increased painful contractions from this morning, patient requesting pain medication at this time. Pt  reports +GFM, denies LOF, VB  ap course uncomplicated as per patient.     - reports right leg thigh swelling x 3 months, workup negative for DVT  - denies any COVID hx, + vaccinated     med: seasonal allergies  surg: colon polyps removed   right breast cystectomy- benign 2017  gyn: sab x 1  ob: denies  current medS: pnv  NKDA

## 2021-08-22 NOTE — OB PROVIDER TRIAGE NOTE - ADDITIONAL INSTRUCTIONS
This is a 36 year old  at 40.0 weeks gestational age who presented to r/o labor    patient in early labor   dr hanson called x2 with messages no answer  discussed with dr diaz, reviewed tracing and approved for discharge   labor precautions reviewed  instructed to return if decreased fetal movement, contractions worsen, water breaks or vaginal bleeding   pt verbalized understanding of instructions given  discharged home

## 2021-08-22 NOTE — OB PROVIDER H&P - NSHPPHYSICALEXAM_GEN_ALL_CORE
Alert and Oriented x 3  Lung CTAB  Heart RRR  Abdomen gravid soft and nontender    FHR: 125  Contractions: irregular   CAT I     Sono:   presentation- confirm vertex position     Vaginal Exam: 5/80/-2    Vital Signs Last 24 Hrs  T(C): 36.8 (22 Aug 2021 20:33), Max: 37 (22 Aug 2021 12:16)  T(F): 98.2 (22 Aug 2021 20:33), Max: 98.6 (22 Aug 2021 12:16)  HR: 101 (22 Aug 2021 21:35) (75 - 101)  BP: 125/76 (22 Aug 2021 21:29) (96/53 - 132/75)  RR: 16 (22 Aug 2021 20:33) (16 - 16)

## 2021-08-22 NOTE — OB PROVIDER TRIAGE NOTE - NSOBPROVIDERNOTE_OBGYN_ALL_OB_FT
This is a 36 year old  at 40.0 weeks gestational age who presented to r/o labor    patient in early labor  plan discussed with dr hanson  labor precautions reviewed  instructed to return if decreased fetal movement, contractions worsen, water breaks or vaginal bleeding   pt verbalized understanding of instructions given  discharged home This is a 36 year old  at 40.0 weeks gestational age who presented to r/o labor    patient in early labor   dr hanson called x2 with messages no answer  discussed with dr diaz, reviewed tracing and approved for discharge   labor precautions reviewed  instructed to return if decreased fetal movement, contractions worsen, water breaks or vaginal bleeding   pt verbalized understanding of instructions given  discharged home

## 2021-08-22 NOTE — OB PROVIDER LABOR PROGRESS NOTE - NS_SUBJECTIVE/OBJECTIVE_OBGYN_ALL_OB_FT
R3 Note 08-22-21 @ 23:50    Seen for progression, comfortable with epidural    AROM'ed clear fluid    VITALS:  T(C): 36.8 (08-22-21 @ 23:16), Max: 37 (08-22-21 @ 12:16)  HR: 73 (08-22-21 @ 23:48) (67 - 102)  BP: 116/66 (08-22-21 @ 23:48) (96/53 - 136/87)  RR: 20 (08-22-21 @ 21:38) (16 - 20)  SpO2: 98% (08-22-21 @ 23:44) (98% - 99%)

## 2021-08-22 NOTE — OB PROVIDER LABOR PROGRESS NOTE - NS_OBIHIFHRDETAILS_OBGYN_ALL_OB_FT
EFM:  BPM, Mod Variability, +Accel, -Decels  [place]: Ctx irregular  VE: 5/90/-2     FHR Category: 1    PLAN:  AROM'ed clear   for pitocin        d/w Dr. Genie Hicks PGY3

## 2021-08-22 NOTE — OB PROVIDER TRIAGE NOTE - HISTORY OF PRESENT ILLNESS
This is a 36 year old  at 40.0 weeks gestational age presents with complaints of contractions q 5 minutes, does not require pain medication at this time. reports +GFM, denies LOF, VB  ap course uncomplicated as per patient  - reports right leg thigh swelling x 3 months, workup negative for DVT  gbs neg    med: seasonal allergies  surg: colon polyps removed   right breast cystectomy- benign 2017  gyn: sab x 1  ob: denies  current medS: pnv  NKDA

## 2021-08-23 ENCOUNTER — APPOINTMENT (OUTPATIENT)
Dept: OBGYN | Facility: CLINIC | Age: 36
End: 2021-08-23

## 2021-08-23 LAB
BASOPHILS # BLD AUTO: 0.07 K/UL — SIGNIFICANT CHANGE UP (ref 0–0.2)
BASOPHILS NFR BLD AUTO: 0.3 % — SIGNIFICANT CHANGE UP (ref 0–2)
COVID-19 SPIKE DOMAIN AB INTERP: POSITIVE
COVID-19 SPIKE DOMAIN ANTIBODY RESULT: >250 U/ML — HIGH
EOSINOPHIL # BLD AUTO: 0.05 K/UL — SIGNIFICANT CHANGE UP (ref 0–0.5)
EOSINOPHIL NFR BLD AUTO: 0.2 % — SIGNIFICANT CHANGE UP (ref 0–6)
HCT VFR BLD CALC: 31.1 % — LOW (ref 34.5–45)
HGB BLD-MCNC: 10.5 G/DL — LOW (ref 11.5–15.5)
IANC: 22.78 K/UL — HIGH (ref 1.5–8.5)
IMM GRANULOCYTES NFR BLD AUTO: 0.9 % — SIGNIFICANT CHANGE UP (ref 0–1.5)
LYMPHOCYTES # BLD AUTO: 2.14 K/UL — SIGNIFICANT CHANGE UP (ref 1–3.3)
LYMPHOCYTES # BLD AUTO: 8.1 % — LOW (ref 13–44)
MCHC RBC-ENTMCNC: 31.5 PG — SIGNIFICANT CHANGE UP (ref 27–34)
MCHC RBC-ENTMCNC: 33.8 GM/DL — SIGNIFICANT CHANGE UP (ref 32–36)
MCV RBC AUTO: 93.4 FL — SIGNIFICANT CHANGE UP (ref 80–100)
MONOCYTES # BLD AUTO: 1.2 K/UL — HIGH (ref 0–0.9)
MONOCYTES NFR BLD AUTO: 4.5 % — SIGNIFICANT CHANGE UP (ref 2–14)
NEUTROPHILS # BLD AUTO: 22.78 K/UL — HIGH (ref 1.8–7.4)
NEUTROPHILS NFR BLD AUTO: 86 % — HIGH (ref 43–77)
NRBC # BLD: 0 /100 WBCS — SIGNIFICANT CHANGE UP
NRBC # FLD: 0 K/UL — SIGNIFICANT CHANGE UP
PLATELET # BLD AUTO: 217 K/UL — SIGNIFICANT CHANGE UP (ref 150–400)
RBC # BLD: 3.33 M/UL — LOW (ref 3.8–5.2)
RBC # FLD: 13.4 % — SIGNIFICANT CHANGE UP (ref 10.3–14.5)
SARS-COV-2 IGG+IGM SERPL QL IA: >250 U/ML — HIGH
SARS-COV-2 IGG+IGM SERPL QL IA: POSITIVE
SARS-COV-2 RNA SPEC QL NAA+PROBE: SIGNIFICANT CHANGE UP
T PALLIDUM AB TITR SER: NEGATIVE — SIGNIFICANT CHANGE UP
WBC # BLD: 26.47 K/UL — HIGH (ref 3.8–10.5)
WBC # FLD AUTO: 26.47 K/UL — HIGH (ref 3.8–10.5)

## 2021-08-23 PROCEDURE — 59400 OBSTETRICAL CARE: CPT | Mod: U9,UB,GC

## 2021-08-23 RX ORDER — OXYCODONE HYDROCHLORIDE 5 MG/1
5 TABLET ORAL ONCE
Refills: 0 | Status: DISCONTINUED | OUTPATIENT
Start: 2021-08-23 | End: 2021-08-24

## 2021-08-23 RX ORDER — KETOROLAC TROMETHAMINE 30 MG/ML
30 SYRINGE (ML) INJECTION ONCE
Refills: 0 | Status: DISCONTINUED | OUTPATIENT
Start: 2021-08-23 | End: 2021-08-24

## 2021-08-23 RX ORDER — ACETAMINOPHEN 500 MG
3 TABLET ORAL
Qty: 0 | Refills: 0 | DISCHARGE
Start: 2021-08-23

## 2021-08-23 RX ORDER — LANOLIN
1 OINTMENT (GRAM) TOPICAL EVERY 6 HOURS
Refills: 0 | Status: DISCONTINUED | OUTPATIENT
Start: 2021-08-23 | End: 2021-08-24

## 2021-08-23 RX ORDER — PRAMOXINE HYDROCHLORIDE 150 MG/15G
1 AEROSOL, FOAM RECTAL EVERY 4 HOURS
Refills: 0 | Status: DISCONTINUED | OUTPATIENT
Start: 2021-08-23 | End: 2021-08-24

## 2021-08-23 RX ORDER — AER TRAVELER 0.5 G/1
1 SOLUTION RECTAL; TOPICAL EVERY 4 HOURS
Refills: 0 | Status: DISCONTINUED | OUTPATIENT
Start: 2021-08-23 | End: 2021-08-24

## 2021-08-23 RX ORDER — OXYCODONE HYDROCHLORIDE 5 MG/1
5 TABLET ORAL
Refills: 0 | Status: DISCONTINUED | OUTPATIENT
Start: 2021-08-23 | End: 2021-08-24

## 2021-08-23 RX ORDER — SODIUM CHLORIDE 9 MG/ML
3 INJECTION INTRAMUSCULAR; INTRAVENOUS; SUBCUTANEOUS EVERY 8 HOURS
Refills: 0 | Status: DISCONTINUED | OUTPATIENT
Start: 2021-08-23 | End: 2021-08-24

## 2021-08-23 RX ORDER — IBUPROFEN 200 MG
600 TABLET ORAL EVERY 6 HOURS
Refills: 0 | Status: DISCONTINUED | OUTPATIENT
Start: 2021-08-23 | End: 2021-08-24

## 2021-08-23 RX ORDER — MAGNESIUM HYDROXIDE 400 MG/1
30 TABLET, CHEWABLE ORAL
Refills: 0 | Status: DISCONTINUED | OUTPATIENT
Start: 2021-08-23 | End: 2021-08-24

## 2021-08-23 RX ORDER — IBUPROFEN 200 MG
1 TABLET ORAL
Qty: 0 | Refills: 0 | DISCHARGE
Start: 2021-08-23

## 2021-08-23 RX ORDER — SIMETHICONE 80 MG/1
80 TABLET, CHEWABLE ORAL EVERY 4 HOURS
Refills: 0 | Status: DISCONTINUED | OUTPATIENT
Start: 2021-08-23 | End: 2021-08-24

## 2021-08-23 RX ORDER — IBUPROFEN 200 MG
600 TABLET ORAL EVERY 6 HOURS
Refills: 0 | Status: COMPLETED | OUTPATIENT
Start: 2021-08-23 | End: 2022-07-22

## 2021-08-23 RX ORDER — OXYTOCIN 10 UNIT/ML
333.33 VIAL (ML) INJECTION
Qty: 20 | Refills: 0 | Status: DISCONTINUED | OUTPATIENT
Start: 2021-08-23 | End: 2021-08-24

## 2021-08-23 RX ORDER — BENZOCAINE 10 %
1 GEL (GRAM) MUCOUS MEMBRANE EVERY 6 HOURS
Refills: 0 | Status: DISCONTINUED | OUTPATIENT
Start: 2021-08-23 | End: 2021-08-24

## 2021-08-23 RX ORDER — HYDROCORTISONE 1 %
1 OINTMENT (GRAM) TOPICAL EVERY 6 HOURS
Refills: 0 | Status: DISCONTINUED | OUTPATIENT
Start: 2021-08-23 | End: 2021-08-24

## 2021-08-23 RX ORDER — TETANUS TOXOID, REDUCED DIPHTHERIA TOXOID AND ACELLULAR PERTUSSIS VACCINE, ADSORBED 5; 2.5; 8; 8; 2.5 [IU]/.5ML; [IU]/.5ML; UG/.5ML; UG/.5ML; UG/.5ML
0.5 SUSPENSION INTRAMUSCULAR ONCE
Refills: 0 | Status: DISCONTINUED | OUTPATIENT
Start: 2021-08-23 | End: 2021-08-24

## 2021-08-23 RX ORDER — DIPHENHYDRAMINE HCL 50 MG
25 CAPSULE ORAL EVERY 6 HOURS
Refills: 0 | Status: DISCONTINUED | OUTPATIENT
Start: 2021-08-23 | End: 2021-08-24

## 2021-08-23 RX ORDER — DIBUCAINE 1 %
1 OINTMENT (GRAM) RECTAL EVERY 6 HOURS
Refills: 0 | Status: DISCONTINUED | OUTPATIENT
Start: 2021-08-23 | End: 2021-08-24

## 2021-08-23 RX ORDER — ACETAMINOPHEN 500 MG
975 TABLET ORAL
Refills: 0 | Status: DISCONTINUED | OUTPATIENT
Start: 2021-08-23 | End: 2021-08-24

## 2021-08-23 RX ADMIN — Medication 975 MILLIGRAM(S): at 14:05

## 2021-08-23 RX ADMIN — Medication 600 MILLIGRAM(S): at 13:05

## 2021-08-23 RX ADMIN — Medication 600 MILLIGRAM(S): at 14:05

## 2021-08-23 RX ADMIN — Medication 975 MILLIGRAM(S): at 17:45

## 2021-08-23 RX ADMIN — Medication 975 MILLIGRAM(S): at 19:45

## 2021-08-23 RX ADMIN — SODIUM CHLORIDE 3 MILLILITER(S): 9 INJECTION INTRAMUSCULAR; INTRAVENOUS; SUBCUTANEOUS at 07:16

## 2021-08-23 RX ADMIN — Medication 1 TABLET(S): at 13:25

## 2021-08-23 RX ADMIN — SODIUM CHLORIDE 3 MILLILITER(S): 9 INJECTION INTRAMUSCULAR; INTRAVENOUS; SUBCUTANEOUS at 14:05

## 2021-08-23 RX ADMIN — Medication 975 MILLIGRAM(S): at 13:24

## 2021-08-23 NOTE — PROGRESS NOTE ADULT - SUBJECTIVE AND OBJECTIVE BOX
Patient seen and examined at bedside, resting comfortably in no acute distress. Denies fever, chills, nausea or vomiting. She is tolerating a regular diet. She is ambulating without difficulty and voiding spontaneously. Pain is well controlled. Bleeding is less than a normal menses. Patient still has Quick Clot vaginal packing and heard in place.    Physical Examination:  Vital Signs: Vital Signs Last 24 Hrs  T(C): 37.1 (23 Aug 2021 10:12), Max: 37.1 (23 Aug 2021 10:12)  T(F): 98.8 (23 Aug 2021 10:12), Max: 98.8 (23 Aug 2021 10:12)  HR: 86 (23 Aug 2021 10:12) (65 - 124)  BP: 104/48 (23 Aug 2021 10:12) (96/53 - 137/67)  BP(mean): --  RR: 18 (23 Aug 2021 06:51) (16 - 20)  SpO2: 98% (23 Aug 2021 10:12) (98% - 100%)  General: alert and oriented, no acute distress  Cardio: regular rate and rhythm  Respiratory: non labored respirations  Abdomen: soft, non-tender, non-distended, uterus firm, palpated below the umbilicus  VE: minimal lochia noted, vaginal packing in place  Musculoskeletal: No erythema/edema, no calf tenderness bilaterally    MEDICATIONS  (STANDING):  acetaminophen   Tablet .. 975 milliGRAM(s) Oral <User Schedule>  diphtheria/tetanus/pertussis (acellular) Vaccine (ADAcel) 0.5 milliLiter(s) IntraMuscular once  ibuprofen  Tablet. 600 milliGRAM(s) Oral every 6 hours  ketorolac   Injectable 30 milliGRAM(s) IV Push once  oxytocin Infusion 333.333 milliUNIT(s)/Min (1000 mL/Hr) IV Continuous <Continuous>  oxytocin Infusion. 2 milliUNIT(s)/Min (2 mL/Hr) IV Continuous <Continuous>  prenatal multivitamin 1 Tablet(s) Oral daily  sodium chloride 0.9% lock flush 3 milliLiter(s) IV Push every 8 hours      Labs:  Blood type: AB Positive  Rubella IgG: RPR: Negative                          10.5<L>   26.47<H> >-----------< 217    (  @ 03:45 )             31.1<L>                        12.6   18.21<H> >-----------< 244    (  @ 22:21 )             36.6      Assessment and Plan:   37 yo P1 s/p  on  now PPD#0 with vaginal packing in place.  Patient is stable and doing well post-partum.   Blood type: AB+    Plan:  - VS per unit protocol  - SCDs for DVT ppx  - Regular diet  - Pain well controlled, continue current pain regimen  - Encourage ambulation  - Vaginal packing in place, heard in place --> will remove in afternoon  - Discharge instructions reviewed  - D/c planning initiated, patient to follow up in office in 6 weeks for post partum visit    Alhaji Medellin MD

## 2021-08-23 NOTE — DISCHARGE NOTE OB - HOSPITAL COURSE
Patient status post normal vaginal delivery  Upon discharge, patient is spontaneously voiding, tolerating a regular diet, out of bed, and reports adequate pain control

## 2021-08-23 NOTE — DISCHARGE NOTE OB - PATIENT PORTAL LINK FT
You can access the FollowMyHealth Patient Portal offered by [facility] by registering at the following website: [email or web address]. By joining [facility]’s FollowMyHealth portal, you will also be able to view your health information using other applications (apps) compatible with our system.

## 2021-08-23 NOTE — OB PROVIDER DELIVERY SUMMARY - NSPROVIDERDELIVERYNOTE_OBGYN_ALL_OB_FT
Spontaneous vaginal delivery of liveborn infant male. Apgars 8/9. Pediatrics at delivery given cat 2 tracing  Head, shoulders and body delivered easily. Nuchal x1. Easily reduced after delivery   Cord clamping was delayed 30s. Cord was cut.  Infant was passed to mother and then to pediatrics.  Placenta delivered spontaneously intact. Uterine massage was performed and pitocin was given.  Fundus was firm. Second degree laceration repaired with 2-0 chromic and 3-0 Vicryl in a running fashion.  There was mild bleeding noted in the vault after second degree laceration was repaired. x2 figure-of-eight sutures were placed. Decision was made to proceed with quick-clot vaginal packing w/ heard catheter insertion.   Adequate hemostasis afterwards   Count correct x2.

## 2021-08-23 NOTE — OB NEONATOLOGY/PEDIATRICIAN DELIVERY SUMMARY - NSPEDSNEONOTESA_OBGYN_ALL_OB_FT
40.1 wk male born via  to a 37 y/o  now  mother.  Maternal history of colon polyps and R breast surgery. No significant prenatal history. Maternal labs include Blood Type AB+ , HIV - , RPR NR , Rubella I , Hep B - , GBS - on , COVID -. AROM at 2315 with clear fluids (ROM hours: 3). Baby emerged vigorous, crying, was w/d/s/s with APGARS of 8/9 . Resuscitation included: none. Mom plans to initiate breastfeeding, consents Hep B vaccine and declines circ.  Highest maternal temp: 36.9. EOS 0.08.     Skin: WWP, pink  Head: NCAT, AFOF, no dysmorphic features  Ears: no pits or tags, no deformity  Nose: nares patent  Mouth: no cleft, + suck  Trunk: No crepitus, lungs CTAB with normal work of breathing  Cardiac: Normal S1 and S2 regular rate, no murmur  Abdomen: Soft, nontender, not distended, no masses  Umbilical cord: clean, dry intact  Extremities: FROM, negative ortolani/sands bilaterally  Spine/anus: No sacral dimple, anus patent  Genitalia: normal  Neuro: +grasp +glenroy +suck

## 2021-08-23 NOTE — CHART NOTE - NSCHARTNOTEFT_GEN_A_CORE
Pt seen in order to remove vaginal packing and Fierro catheter 12hr postop.     Pt tolerated removal well, no abnormalities noted.    AISHA Rosado, PGY-1     D/w Dr. Medellin

## 2021-08-23 NOTE — DISCHARGE NOTE OB - CARE PROVIDER_API CALL
Perlita Mccord (MD)  Obstetrics and Gynecology  [facility] [place] [address]  Phone: [phone]  Fax: [phone]  Follow Up Time:

## 2021-08-23 NOTE — DISCHARGE NOTE OB - MATERIALS PROVIDED
Guide to Postpartum Care Vaccinations/[facility]  Screening Program/  Immunization Record/Breastfeeding Log/Breastfeeding Mother’s Support Group Information/Guide to Postpartum Care/[facility] Hearing Screen Program/Back To Sleep Handout/Shaken Baby Prevention Handout/Breastfeeding Guide and Packet/Birth Certificate Instructions

## 2021-08-23 NOTE — OB PROVIDER DELIVERY SUMMARY - NSSELHIDDEN_OBGYN_ALL_OB_FT
[NS_[email or web address]_ALL_OB_FT:[id number]],[NS_DeliveryRN_OBGYN_ALL_OB_FT:[id number]],[NS_DeliveryAssist1_OBGYN_ALL_OB_FT:[id number]]

## 2021-08-23 NOTE — OB RN DELIVERY SUMMARY - NSSELHIDDEN_OBGYN_ALL_OB_FT
[NS_DeliveryAttending1_OBGYN_ALL_OB_FT:[id number]==],[NS_DeliveryRN_OBGYN_ALL_OB_FT:[id number]=]

## 2021-08-23 NOTE — OB RN DELIVERY SUMMARY - NS_SEPSISRSKCALC_OBGYN_ALL_OB_FT
EOS calculated successfully. EOS Risk Factor: 0.5/1000 live births ([facility] national incidence); GA=40w1d; Temp=98.4; ROM=2.633; GBS='Negative'; Antibiotics='No antibiotics or any antibiotics < 2 hrs prior to birth'

## 2021-08-24 VITALS
RESPIRATION RATE: 16 BRPM | HEART RATE: 88 BPM | DIASTOLIC BLOOD PRESSURE: 77 MMHG | SYSTOLIC BLOOD PRESSURE: 125 MMHG | OXYGEN SATURATION: 100 % | TEMPERATURE: 99 F

## 2021-08-24 LAB
BASOPHILS # BLD AUTO: 0.04 K/UL — SIGNIFICANT CHANGE UP (ref 0–0.2)
BASOPHILS NFR BLD AUTO: 0.2 % — SIGNIFICANT CHANGE UP (ref 0–2)
EOSINOPHIL # BLD AUTO: 0.28 K/UL — SIGNIFICANT CHANGE UP (ref 0–0.5)
EOSINOPHIL NFR BLD AUTO: 1.6 % — SIGNIFICANT CHANGE UP (ref 0–6)
HCT VFR BLD CALC: 25.8 % — LOW (ref 34.5–45)
HGB BLD-MCNC: 8.9 G/DL — LOW (ref 11.5–15.5)
IANC: 13.81 K/UL — HIGH (ref 1.5–8.5)
IMM GRANULOCYTES NFR BLD AUTO: 1 % — SIGNIFICANT CHANGE UP (ref 0–1.5)
LYMPHOCYTES # BLD AUTO: 12.9 % — LOW (ref 13–44)
LYMPHOCYTES # BLD AUTO: 2.27 K/UL — SIGNIFICANT CHANGE UP (ref 1–3.3)
MCHC RBC-ENTMCNC: 32 PG — SIGNIFICANT CHANGE UP (ref 27–34)
MCHC RBC-ENTMCNC: 34.5 GM/DL — SIGNIFICANT CHANGE UP (ref 32–36)
MCV RBC AUTO: 92.8 FL — SIGNIFICANT CHANGE UP (ref 80–100)
MONOCYTES # BLD AUTO: 1.08 K/UL — HIGH (ref 0–0.9)
MONOCYTES NFR BLD AUTO: 6.1 % — SIGNIFICANT CHANGE UP (ref 2–14)
NEUTROPHILS # BLD AUTO: 13.81 K/UL — HIGH (ref 1.8–7.4)
NEUTROPHILS NFR BLD AUTO: 78.2 % — HIGH (ref 43–77)
NRBC # BLD: 0 /100 WBCS — SIGNIFICANT CHANGE UP
NRBC # FLD: 0 K/UL — SIGNIFICANT CHANGE UP
PLATELET # BLD AUTO: 193 K/UL — SIGNIFICANT CHANGE UP (ref 150–400)
RBC # BLD: 2.78 M/UL — LOW (ref 3.8–5.2)
RBC # FLD: 13.6 % — SIGNIFICANT CHANGE UP (ref 10.3–14.5)
WBC # BLD: 17.65 K/UL — HIGH (ref 3.8–10.5)
WBC # FLD AUTO: 17.65 K/UL — HIGH (ref 3.8–10.5)

## 2021-08-24 RX ORDER — FERROUS SULFATE 325(65) MG
325 TABLET ORAL THREE TIMES A DAY
Refills: 0 | Status: DISCONTINUED | OUTPATIENT
Start: 2021-08-24 | End: 2021-08-24

## 2021-08-24 RX ORDER — ASCORBIC ACID 60 MG
500 TABLET,CHEWABLE ORAL DAILY
Refills: 0 | Status: DISCONTINUED | OUTPATIENT
Start: 2021-08-24 | End: 2021-08-24

## 2021-08-24 RX ADMIN — Medication 500 MILLIGRAM(S): at 16:31

## 2021-08-24 RX ADMIN — Medication 600 MILLIGRAM(S): at 03:32

## 2021-08-24 RX ADMIN — Medication 975 MILLIGRAM(S): at 20:51

## 2021-08-24 RX ADMIN — SODIUM CHLORIDE 3 MILLILITER(S): 9 INJECTION INTRAMUSCULAR; INTRAVENOUS; SUBCUTANEOUS at 00:00

## 2021-08-24 RX ADMIN — Medication 1 TABLET(S): at 16:31

## 2021-08-24 RX ADMIN — Medication 975 MILLIGRAM(S): at 21:20

## 2021-08-24 RX ADMIN — Medication 325 MILLIGRAM(S): at 16:31

## 2021-08-24 RX ADMIN — Medication 600 MILLIGRAM(S): at 02:53

## 2021-08-24 NOTE — PROGRESS NOTE ADULT - SUBJECTIVE AND OBJECTIVE BOX
S: 37yo   PPD#1 s/p  with 2nd degree laceration. . s/p quick clot vaginal packing & heard.   Patient feels well. Pain is well controlled. She is tolerating a regular diet and passing flatus. She is voiding spontaneously, and ambulating without difficulty. Denies CP/SOB. Denies lightheadedness/dizziness. Denies N/V.    O:  Vitals:  Vital Signs Last 24 Hrs  T(C): 36.9 (24 Aug 2021 10:28), Max: 37.2 (23 Aug 2021 17:35)  T(F): 98.5 (24 Aug 2021 10:28), Max: 98.9 (23 Aug 2021 17:35)  HR: 73 (24 Aug 2021 10:28) (73 - 91)  BP: 112/63 (24 Aug 2021 10:28) (102/59 - 116/64)  BP(mean): --  RR: 16 (24 Aug 2021 05:33) (16 - 17)  SpO2: 99% (24 Aug 2021 10:28) (97% - 99%)    MEDICATIONS  (STANDING):  acetaminophen   Tablet .. 975 milliGRAM(s) Oral <User Schedule>  ascorbic acid 500 milliGRAM(s) Oral daily  diphtheria/tetanus/pertussis (acellular) Vaccine (ADAcel) 0.5 milliLiter(s) IntraMuscular once  ferrous    sulfate 325 milliGRAM(s) Oral three times a day  ibuprofen  Tablet. 600 milliGRAM(s) Oral every 6 hours  prenatal multivitamin 1 Tablet(s) Oral daily  sodium chloride 0.9% lock flush 3 milliLiter(s) IV Push every 8 hours      Labs:  Blood type: AB Positive  Rubella IgG: RPR: Negative                          8.9<L>   17.65<H> >-----------< 193    (  @ 08:40 )             25.8<L>                        10.5<L>   26.47<H> >-----------< 217    (  @ 03:45 )             31.1<L>                        12.6   18.21<H> >-----------< 244    (  @ 22:21 )             36.6        Physical Exam:  General: NAD  Abdomen: soft, non-tender, non-distended, fundus firm  Vaginal: Lochia wnl  Extremities: No erythema/edema    A/P: 37yo PPD#1 s/p  with 2nd degree laceration. s/p vaginal packing & heard.  Patient is stable and doing well post-partum.    - Pain well controlled, continue current pain regimen  - Increase ambulation, SCDs when not ambulating  - Continue regular diet  - Continue iron & vitamin C for asymptomatic anemia.  - Discharge planning.    Destini OTTO

## 2021-08-24 NOTE — LACTATION INITIAL EVALUATION - LACTATION INTERVENTIONS
Primary RN made aware of consult and plan./initiate/review safe skin-to-skin/initiate/review hand expression/initiate/review techniques for position and latch/post discharge community resources provided/initiate/review breast massage/compression/reviewed components of an effective feeding and at least 8 effective feedings per day required/reviewed importance of monitoring infant diapers, the breastfeeding log, and minimum output each day/reviewed risks of artificial nipples/reviewed benefits and recommendations for rooming in/reviewed feeding on demand/by cue at least 8 times a day/recommended follow-up with pediatrician within 24 hours of discharge

## 2021-09-30 ENCOUNTER — APPOINTMENT (OUTPATIENT)
Dept: OBGYN | Facility: CLINIC | Age: 36
End: 2021-09-30
Payer: COMMERCIAL

## 2021-09-30 VITALS
BODY MASS INDEX: 25.16 KG/M2 | HEIGHT: 63 IN | HEART RATE: 69 BPM | SYSTOLIC BLOOD PRESSURE: 131 MMHG | DIASTOLIC BLOOD PRESSURE: 88 MMHG | TEMPERATURE: 94.5 F | WEIGHT: 142 LBS

## 2021-09-30 VITALS — SYSTOLIC BLOOD PRESSURE: 127 MMHG | DIASTOLIC BLOOD PRESSURE: 81 MMHG

## 2021-09-30 DIAGNOSIS — Z34.03 ENCOUNTER FOR SUPERVISION OF NORMAL FIRST PREGNANCY, THIRD TRIMESTER: ICD-10-CM

## 2021-09-30 DIAGNOSIS — O36.80X0 PREGNANCY WITH INCONCLUSIVE FETAL VIABILITY, NOT APPLICABLE OR UNSPECIFIED: ICD-10-CM

## 2021-09-30 DIAGNOSIS — Z34.01 ENCOUNTER FOR SUPERVISION OF NORMAL FIRST PREGNANCY, FIRST TRIMESTER: ICD-10-CM

## 2021-09-30 DIAGNOSIS — O20.9 HEMORRHAGE IN EARLY PREGNANCY, UNSPECIFIED: ICD-10-CM

## 2021-09-30 DIAGNOSIS — Z34.92 ENCOUNTER FOR SUPERVISION OF NORMAL PREGNANCY, UNSPECIFIED, SECOND TRIMESTER: ICD-10-CM

## 2021-09-30 PROCEDURE — 0503F POSTPARTUM CARE VISIT: CPT

## 2021-09-30 NOTE — HISTORY OF PRESENT ILLNESS
[Delivery Date: ___] : on [unfilled] [Male] : Delivery History: baby boy [Boy] : baby is a boy [Infant's Name ___] : [unfilled] [___ Lbs] : [unfilled] lbs [___ Oz] : [unfilled] oz [Not Circumcised] : not circumcised [Living at Home] : is currently living at home [Resumed Menses] : has resumed her menses [Intended Contraception] : Intended Contraception: [] : delivered by vaginal delivery [Breastfeeding] : currently nursing [Awake] : awake [Alert] : alert [Soft] : soft [Oriented x3] : oriented to person, place, and time [No Lesions] : no genitalia lesions [Normal] : cervix [Labia Majora] : labia major [Labia Minora] : labia minora [No Bleeding] : there was no active vaginal bleeding [Complications:___] : no complications [Resumed [place]] : has not resumed intercourse [Acute Distress] : no acute distress [Mass] : no breast mass [Nipple Discharge] : no nipple discharge [Axillary LAD] : no axillary lymphadenopathy [Tender] : non tender [Distended] : not distended [Depressed Mood] : not depressed [Flat Affect] : affect not flat [Motion Tenderness] : there was no cervical motion tenderness [Tenderness] : nontender [Adnexa Tenderness] : were not tender [Doing Well] : is doing well [No Sign of Infection] : is showing no signs of infection [Excellent Pain Control] : has excellent pain control [None] : None [FreeTextEntry8] : This 35 yo  presents for PPV post vaginal delivery; overall feels well, voiding and stooling without issue, desires to use condoms for now. [de-identified] : Nl PPV [de-identified] : Kegel exercise sheet in hand; RTO 3-4 months annual

## 2021-10-14 ENCOUNTER — TRANSCRIPTION ENCOUNTER (OUTPATIENT)
Age: 36
End: 2021-10-14

## 2021-10-21 NOTE — OB PROVIDER H&P - NS_SPONTANEOUSVAG_OBGYN_ALL_OB_NU
Please increase your insulin by 3Units tonight to 21Units.  Please take your blood sugar for 2-3 days and then if readings are above >130 please increase by 2 units. Please continue until most of your fasting blood sugar's are .    Cristhian Hartley D.O.  
0

## 2021-11-03 NOTE — OB RN TRIAGE NOTE - NS_MODEOFARRIVAL_OBGYN_ALL_OB
32 y/o male with no pmh presenting to the ED with chest pain since 10pm being admitted to the hospital for STEMI.    Plan:    Neuro:  - Awake, alert, oriented, x 4  - No active issues    HEENT:  - No active issues    Respiratory:  - Unlabored respirations on RA, 99% on RA  - No active issues    CV:  - Patient presented to ER with c/o chest pain.   - EKG revealed ST elevation in I, II, III, aVF, v5, v6, with STD in aVR, v1 v2  - Initial set cardiac enzyme   - MARJORIE score  - ALEJANDRO score:   - Given active chest pain and GHADA, s/p urgent cath  and Brilinta 180 mg and heparin loaded in ED  - Cath showed  - Admit to CCU  - Assess for resolution of chest pain and for recurrent chest pain  - Serial EKG to assess for resolution of ST changes  - Monitor vital signs to monitor hemodynamic stability  - Continuous cardiac monitoring to monitor for arrhythmias  - CBC, CMP, coags, HbA1C, TSH, lipids for comorbidities, Trend cardiac enzymes  - Aspirin 81 PO daily, Clopidogrel 75 PO daily, Brilinta 90 PO BID, Lipitor 80 mg PO daily  - Post cardiac cath care as per protocol.   - Check radial/groin site for hematoma or bleeding.  - Introduce beta blockers as tolerated: Metoprolol 12.5 mg BID  - Introduce ACE as tolerated. Hold for now due to recent dye load due to cardiac cath.  - Low fat DASH diet  - ECHO: 2D ECHO in 3-4 days to reevaluate LV function and to r/o thrombus.    GI:  - DASH diet  - No active issues    Renal:  - SCr   - No active issues    Endocrine:  - Follow up a1c, TSH, and lipid profile  - No other active issues    DVT prophylaxis:  Heparin subq 34 y/o male with no pmh presenting to the ED with chest pain since 10pm being admitted to the hospital for STEMI.    Plan:    Neuro:  - Awake, alert, oriented, x 4  - No active issues    HEENT:  - No active issues    Respiratory:  - Unlabored respirations on RA, 99% on RA  - No active issues    CV:  - Patient presented to ER with c/o chest pain.   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   - Check radial for hematoma or bleeding.  - Low fat DASH diet      GI:  - DASH diet  - No active issues    Renal:  - SCr 1.05  - No active issues    Endocrine:  - Follow up a1c, TSH, and lipid profile  - No other active issues    ID:  - febrile  - F/u cultures  DVT prophylaxis:  Heparin subq Car

## 2022-01-10 ENCOUNTER — TRANSCRIPTION ENCOUNTER (OUTPATIENT)
Age: 37
End: 2022-01-10

## 2022-02-08 ENCOUNTER — NON-APPOINTMENT (OUTPATIENT)
Age: 37
End: 2022-02-08

## 2022-02-11 ENCOUNTER — APPOINTMENT (OUTPATIENT)
Dept: OBGYN | Facility: CLINIC | Age: 37
End: 2022-02-11
Payer: COMMERCIAL

## 2022-02-11 ENCOUNTER — NON-APPOINTMENT (OUTPATIENT)
Age: 37
End: 2022-02-11

## 2022-02-11 VITALS — SYSTOLIC BLOOD PRESSURE: 127 MMHG | DIASTOLIC BLOOD PRESSURE: 76 MMHG

## 2022-02-11 VITALS
WEIGHT: 139 LBS | SYSTOLIC BLOOD PRESSURE: 143 MMHG | BODY MASS INDEX: 24.63 KG/M2 | HEIGHT: 63 IN | DIASTOLIC BLOOD PRESSURE: 86 MMHG | HEART RATE: 68 BPM

## 2022-02-11 DIAGNOSIS — M79.606 PAIN IN LEG, UNSPECIFIED: ICD-10-CM

## 2022-02-11 PROCEDURE — 99395 PREV VISIT EST AGE 18-39: CPT

## 2022-02-11 NOTE — HISTORY OF PRESENT ILLNESS
[PapSmeardate] : Dec 2020 [TextBox_31] : neg pap and HPV [ColonoscopyDate] : Feb 2020 [TextBox_43] : WNL

## 2022-02-11 NOTE — PHYSICAL EXAM
[Chaperone Present] : A chaperone was present in the examining room during all aspects of the physical examination [Appropriately responsive] : appropriately responsive [Alert] : alert [No Acute Distress] : no acute distress [Soft] : soft [Non-tender] : non-tender [No Lesions] : no lesions [Oriented x3] : oriented x3 [Examination Of The Breasts] : a normal appearance [No Masses] : no breast masses were palpable [Labia Majora] : normal [Labia Minora] : normal [No Bleeding] : There was no active vaginal bleeding [Normal] : normal [Tenderness] : nontender [Uterine Adnexae] : non-palpable

## 2022-02-14 ENCOUNTER — APPOINTMENT (OUTPATIENT)
Dept: OBGYN | Facility: CLINIC | Age: 37
End: 2022-02-14

## 2022-06-28 NOTE — COUNSELING
[Fertility Options] : fertility options Mustarde Flap Text: The defect edges were debeveled with a #15 scalpel blade.  Given the size, depth and location of the defect and the proximity to free margins a Mustarde flap was deemed most appropriate.  Using a sterile surgical marker, an appropriate flap was drawn incorporating the defect. The area thus outlined was incised with a #15 scalpel blade.  The skin margins were undermined to an appropriate distance in all directions utilizing iris scissors.

## 2023-08-07 ENCOUNTER — APPOINTMENT (OUTPATIENT)
Dept: OBGYN | Facility: CLINIC | Age: 38
End: 2023-08-07
Payer: COMMERCIAL

## 2023-08-07 VITALS
HEART RATE: 60 BPM | DIASTOLIC BLOOD PRESSURE: 70 MMHG | WEIGHT: 132 LBS | SYSTOLIC BLOOD PRESSURE: 110 MMHG | HEIGHT: 63 IN | BODY MASS INDEX: 23.39 KG/M2 | OXYGEN SATURATION: 98 % | TEMPERATURE: 97.9 F

## 2023-08-07 DIAGNOSIS — Z01.419 ENCOUNTER FOR GYNECOLOGICAL EXAMINATION (GENERAL) (ROUTINE) W/OUT ABNORMAL FINDINGS: ICD-10-CM

## 2023-08-07 DIAGNOSIS — N60.11 DIFFUSE CYSTIC MASTOPATHY OF RIGHT BREAST: ICD-10-CM

## 2023-08-07 DIAGNOSIS — N60.12 DIFFUSE CYSTIC MASTOPATHY OF RIGHT BREAST: ICD-10-CM

## 2023-08-07 DIAGNOSIS — Z98.890 OTHER SPECIFIED POSTPROCEDURAL STATES: ICD-10-CM

## 2023-08-07 LAB
HCG UR QL: NEGATIVE
QUALITY CONTROL: YES

## 2023-08-07 PROCEDURE — 99213 OFFICE O/P EST LOW 20 MIN: CPT

## 2023-08-07 NOTE — PHYSICAL EXAM
[Chaperone Present] : A chaperone was present in the examining room during all aspects of the physical examination [Appropriately responsive] : appropriately responsive [Alert] : alert [No Acute Distress] : no acute distress [No Lymphadenopathy] : no lymphadenopathy [Mass] : mass [Soft] : soft [Non-tender] : non-tender [Non-distended] : non-distended [No HSM] : No HSM [No Lesions] : no lesions [No Mass] : no mass [Oriented x3] : oriented x3 [Examination Of The Breasts] : a normal appearance [Labia Majora] : normal [Labia Minora] : normal [Normal] : normal [Uterine Adnexae] : normal

## 2023-08-07 NOTE — HISTORY OF PRESENT ILLNESS
[Condoms] : uses condoms [Y] : Patient is sexually active [Monogamous (Male Partner)] : is monogamous with a male partner [LMPDate] : 7/26/23 [MensesFreq] : 28 [MensesLength] : 6 - 7 [PGHxTotal] : 2 [BannerxFulerm] : 1 [[facility] Hospital[facility]] : 1 [PGHxABSpont] : 1 [FreeTextEntry1] : GISELA

## 2023-08-13 LAB
CYTOLOGY CVX/VAG DOC THIN PREP: NORMAL
HPV HIGH+LOW RISK DNA PNL CVX: NOT DETECTED

## 2023-08-21 ENCOUNTER — RESULT REVIEW (OUTPATIENT)
Age: 38
End: 2023-08-21

## 2023-08-21 ENCOUNTER — OUTPATIENT (OUTPATIENT)
Dept: OUTPATIENT SERVICES | Facility: HOSPITAL | Age: 38
LOS: 1 days | End: 2023-08-21
Payer: COMMERCIAL

## 2023-08-21 ENCOUNTER — APPOINTMENT (OUTPATIENT)
Dept: ULTRASOUND IMAGING | Facility: CLINIC | Age: 38
End: 2023-08-21
Payer: COMMERCIAL

## 2023-08-21 DIAGNOSIS — N60.11 DIFFUSE CYSTIC MASTOPATHY OF RIGHT BREAST: ICD-10-CM

## 2023-08-21 DIAGNOSIS — Z98.890 OTHER SPECIFIED POSTPROCEDURAL STATES: Chronic | ICD-10-CM

## 2023-08-21 DIAGNOSIS — N60.12 DIFFUSE CYSTIC MASTOPATHY OF LEFT BREAST: ICD-10-CM

## 2023-08-21 PROCEDURE — 76641 ULTRASOUND BREAST COMPLETE: CPT | Mod: 26,50

## 2023-08-21 PROCEDURE — 76641 ULTRASOUND BREAST COMPLETE: CPT

## 2023-09-15 ENCOUNTER — NON-APPOINTMENT (OUTPATIENT)
Age: 38
End: 2023-09-15

## 2023-10-31 ENCOUNTER — NON-APPOINTMENT (OUTPATIENT)
Age: 38
End: 2023-10-31

## 2023-11-12 ENCOUNTER — NON-APPOINTMENT (OUTPATIENT)
Age: 38
End: 2023-11-12

## 2023-11-17 ENCOUNTER — APPOINTMENT (OUTPATIENT)
Dept: OTOLARYNGOLOGY | Facility: CLINIC | Age: 38
End: 2023-11-17
Payer: COMMERCIAL

## 2023-11-17 ENCOUNTER — NON-APPOINTMENT (OUTPATIENT)
Age: 38
End: 2023-11-17

## 2023-11-17 VITALS
HEIGHT: 63 IN | HEART RATE: 61 BPM | DIASTOLIC BLOOD PRESSURE: 82 MMHG | BODY MASS INDEX: 23.39 KG/M2 | SYSTOLIC BLOOD PRESSURE: 123 MMHG | WEIGHT: 132 LBS

## 2023-11-17 DIAGNOSIS — J34.2 DEVIATED NASAL SEPTUM: ICD-10-CM

## 2023-11-17 DIAGNOSIS — J31.0 CHRONIC RHINITIS: ICD-10-CM

## 2023-11-17 PROCEDURE — 31231 NASAL ENDOSCOPY DX: CPT

## 2023-11-17 PROCEDURE — 99204 OFFICE O/P NEW MOD 45 MIN: CPT | Mod: 25

## 2023-11-17 RX ORDER — CETIRIZINE HCL 10 MG
TABLET ORAL
Refills: 0 | Status: ACTIVE | COMMUNITY

## 2023-11-17 RX ORDER — AZELASTINE HYDROCHLORIDE 137 UG/1
137 SPRAY, METERED NASAL
Qty: 1 | Refills: 1 | Status: ACTIVE | COMMUNITY
Start: 2023-11-17 | End: 1900-01-01

## 2023-11-17 RX ORDER — FLUTICASONE PROPIONATE 50 UG/1
50 SPRAY, METERED NASAL TWICE DAILY
Qty: 1 | Refills: 1 | Status: ACTIVE | COMMUNITY
Start: 2023-11-17 | End: 1900-01-01

## 2023-12-22 ENCOUNTER — APPOINTMENT (OUTPATIENT)
Dept: OTOLARYNGOLOGY | Facility: CLINIC | Age: 38
End: 2023-12-22

## 2024-08-12 NOTE — ED PROVIDER NOTE - PSH
Encounter Date: 2024       History     Chief Complaint   Patient presents with    Rupture of Membranes     Linda Bella is a 43 y.o. [id number] at 38w5d who presents complaining of leakage of fluid. Patient states she noticed trickling of fluid which dripped onto the floor at 4:30 am which has continued to leak since. She denies any further complaints at this time.    This IUP is complicated by AMA, IVF pregnancy.  Patient reports irregular contractions, denies vaginal bleeding, reports LOF.   Fetal Movement: normal        Review of patient's allergies indicates:   Allergen Reactions    Adhesive Hives    Codeine      Other reaction(s): GI Intolerance    Percocet [oxycodone-acetaminophen] Nausea Only     Past Medical History:   Diagnosis Date    Anxiety     Endometriosis     H/O LEEP      Past Surgical History:   Procedure Laterality Date    endometriosis      x2 procedures round about  and      OTHER SURGICAL HISTORY      ex lap for endometriosis    WISDOM TOOTH EXTRACTION       Family History   Problem Relation Name Age of Onset    Hypothyroidism Mother mom     Fibromyalgia Mother mom     Arthritis Mother mom         spinal stenosis    Hyperlipidemia Mother mom     Asthma Mother mom     Miscarriages / Stillbirths Mother mom     Depression Mother mom     Hyperlipidemia Father Tashi     Heart disease Father Tashi     Heart failure Father Tashi     Endometriosis Sister 1     Bulemia Sister 1     Miscarriages / Stillbirths Sister 1     Arthritis Brother 1     Raynaud syndrome Brother 1     Hyperlipidemia Brother Hipolito     Allergic rhinitis Brother Hipolito     Hyperlipidemia Brother Malek     Alcohol abuse Maternal Grandmother Marianne         alcohol and drug problems    Drug abuse Maternal Grandmother Marianne     Heart disease Maternal Grandfather Elian     Heart disease Paternal Grandmother Tania     Heart disease Paternal Grandfather Jidu     Stroke Paternal Grandfather Jidu     Diabetes Paternal  Grandfather Jidwayne     Alcohol abuse Maternal Aunt Yazmin     Alcohol abuse Paternal Uncle Malechristian     Ovarian cancer Cousin      Breast cancer Neg Hx      Colon cancer Neg Hx      Melanoma Neg Hx       Social History     Tobacco Use    Smoking status: Never    Smokeless tobacco: Never   Substance Use Topics    Alcohol use: Not Currently     Comment: Very rarely - 1-2 drinks a month if that.    Drug use: No     Review of Systems   Constitutional:  Negative for chills and fever.   HENT:  Negative for congestion and sore throat.    Respiratory:  Negative for cough and shortness of breath.    Cardiovascular:  Negative for chest pain and palpitations.   Gastrointestinal:  Negative for nausea and vomiting.   Genitourinary:  Positive for vaginal discharge (clear amniotic fluid). Negative for dysuria.   Neurological:  Negative for seizures and weakness.       Physical Exam     Initial Vitals   BP Pulse Resp Temp SpO2   08/12/24 0552 08/12/24 0550 08/12/24 0552 -- 08/12/24 0550   118/77 75 16  99 %      MAP       --                Physical Exam    Nursing note and vitals reviewed.  Constitutional: She appears well-developed and well-nourished. She is not diaphoretic. No distress.   HENT:   Head: Normocephalic and atraumatic.   Eyes: EOM are normal.   Neck:   Normal range of motion.  Cardiovascular:  Normal rate.           Pulmonary/Chest: No respiratory distress.   Abdominal: Distension: gravid abdomen. There is no abdominal tenderness. There is no rebound and no guarding.   Genitourinary:    Vaginal discharge present.     Musculoskeletal:         General: No tenderness or edema. Normal range of motion.      Cervical back: Normal range of motion.     Neurological: She is alert and oriented to person, place, and time.   Skin: Skin is warm and dry.   Psychiatric: She has a normal mood and affect. Her behavior is normal.     OB LABOR EXAM:     Membranes ruptured: Yes.   Method: Sterile speculum exam per MD and Sterile vaginal  exam per MD.             Amniotic Fluid Color: clear.   Amniotic Fluid Amount: large.   Comments: SVE: ft/th/hi  SSE: +pooling of amniotic fluid, +Valsalva, +nitrazine, +ferning       ED Course   Obtain Fetal nonstress test (NST)    Date/Time: 2024 6:27 AM    Performed by: Janie Banda MD  Authorized by: Molly Sidhu MD    Nonstress Test:     Variability:  6-25 BPM    Decelerations:  None    Accelerations:  15 bpm    Baseline:  130    Uterine Irritability: Yes      Contractions:  Irregular  Biophysical Profile:     Nonstress Test Interpretation: reactive      Overall Impression:  Reassuring  Post-procedure:     Patient tolerance:  Patient tolerated the procedure well with no immediate complications    Labs Reviewed   CBC W/ AUTO DIFFERENTIAL - Abnormal       Result Value    WBC 8.28      RBC 3.31 (*)     Hemoglobin 11.4 (*)     Hematocrit 31.3 (*)     MCV 95      MCH 34.4 (*)     MCHC 36.4 (*)     RDW 11.9      Platelets 229      MPV 10.6      Immature Granulocytes 0.4      Gran # (ANC) 4.7      Immature Grans (Abs) 0.03      Lymph # 2.7      Mono # 0.6      Eos # 0.1      Baso # 0.05      nRBC 0      Gran % 57.1      Lymph % 33.0      Mono % 7.5      Eosinophil % 1.4      Basophil % 0.6      Differential Method Automated     POCT FERN TEST, AMNIO - Abnormal    Fern Test Positive (*)    TREPONEMA PALLIDIUM ANTIBODIES IGG, IGM   TREPONEMA PALLIDIUM ANTIBODIES IGG, IGM   POCT PH OF BODY FLUIDS    pH, Body Fluid Nitrazine Positive     TYPE & SCREEN          Imaging Results    None            Medical Decision Making  Linda Bella is a 43 y.o. [id number] at 38w5d who presents complaining of leakage of fluid.    Pulse:  [75-82] 79  Resp:  [16] 16  SpO2:  [99 %] 99 %  BP: (118)/(77) 118/77    NST: reactive and reassuring  [place]: uterine irritability w/rare ctx    Rupture of Membranes  - Patient grossly ruptured in OB ED with +pooling, +Valsalva, +nitrazine, +ferning  - Cervix ft/th/hi  - Patient admitted to  L&D for PROM. Please see H&P for further details/assessment/plan.    Janie Banda MD  OB/GYN PGY-1                Attending Attestation:   Physician Attestation Statement for Resident:  As the supervising MD   Physician Attestation Statement: I have personally seen and examined this patient.   I agree with the above history.  -:   As the supervising MD I agree with the above PE.     As the supervising MD I agree with the above treatment, course, plan, and disposition.   -:     NST reactive and reassuring, toco with rare irregular contractions.  Exam c/w SROM.  Sonogram confirms cephalic presentation.  Admit to L&D.  Anticipate .    Marta Parker MD,ATILIO  Date and Time: 2024 6:33 AM  OB Hospitalist          I was personally present during the critical portions of the procedure(s) performed by the resident and was immediately available in the ED to provide services and assistance as needed during the entire procedure.   I have reviewed the following: old records at this facility.                                       Clinical Impression:  Final diagnoses:  [O42.90] PROM (premature rupture of membranes) (Primary)  [Z3A.38] 38 weeks gestation of pregnancy          ED Disposition Condition    Send to L&D                 Janie Banda MD  Resident  [phone]       Marta Parker MD  [id number]     No significant past surgical history

## 2025-01-13 ENCOUNTER — NON-APPOINTMENT (OUTPATIENT)
Age: 40
End: 2025-01-13

## 2025-04-10 NOTE — OB PROVIDER H&P - NSPPHNORISK_OBGYN_ALL_OB
Render Note In Bullet Format When Appropriate: No Show Applicator Variable?: Yes Detail Level: Detailed Consent: The patient's consent was obtained including but not limited to risks of crusting, scabbing, blistering, scarring, darker or lighter pigmentary change, recurrence, incomplete removal and infection. Application Tool (Optional): Liquid Nitrogen Sprayer Post-Care Instructions: I reviewed with the patient in detail post-care instructions. Patient is to wear sunprotection, and avoid picking at any of the treated lesions. Pt may apply Vaseline to crusted or scabbing areas. Number Of Freeze-Thaw Cycles: 1 freeze-thaw cycle Medical Necessity Clause: This procedure was medically necessary because the lesions that were treated were: Spray Paint Text: The liquid nitrogen was applied to the skin utilizing a spray paint frosting technique. Duration Of Freeze Thaw-Cycle (Seconds): 10 Medical Necessity Information: It is in your best interest to select a reason for this procedure from the list below. All of these items fulfill various CMS LCD requirements except the new and changing color options. In my judgment no risk for PPH has been identified at this time.

## 2025-07-09 NOTE — OB PROVIDER TRIAGE NOTE - INTERNATIONAL TRAVEL
Attempted to contact pt to discuss results.   No answer, left voicemail to return call to clinic.   Will send out letter to notify pt to reach out to clinic regarding results.    No

## 2025-07-11 ENCOUNTER — NON-APPOINTMENT (OUTPATIENT)
Age: 40
End: 2025-07-11

## 2025-07-14 NOTE — ED ADULT TRIAGE NOTE - AS O2 DELIVERY
Pt present for INR check. Pt Taking 2.5 mg Monday and Thursday and 5 mg all other days. No missed doses, bruising, or bleeding. INR today is 2.7. VO per Dr. Reis, return to original dose of 1 mg Monday and Thursday, and 2 mg all other days. Pt is to return in 1 week. Pt verbalized understanding.      room air

## 2025-07-20 NOTE — OB PROVIDER TRIAGE NOTE - NSOBPROVIDERNOTE_OBGYN_ALL_OB_FT
Surgical History:   Procedure Laterality Date    BREAST BIOPSY Right     COLONOSCOPY N/A 5/19/2021    COLONOSCOPY / BMI 31 performed by Boaz Zapata MD at [facility] ENDOSCOPY    GYN      hysterectomy    KNEE ARTHROSCOPY Bilateral     AR UNLISTED PROCEDURE ABDOMEN PERITONEUM & OMENTUM      cyst removed from upper ABD.        Social History     Socioeconomic History    Marital status:    Tobacco Use    Smoking status: Never    Smokeless tobacco: Never   Substance and Sexual Activity    Alcohol use: No    Drug use: No     Social Drivers of Health     Financial Resource Strain: Medium Risk (3/12/2025)    Received from [facility]    Financial Resource Strain     Difficulty Paying Living Expenses: Somewhat hard     Difficulty Paying Medical Expenses: No   Food Insecurity: No Food Insecurity (3/12/2025)    Received from [facility]    Food Insecurity     Worried about Running Out of Food in the Last Year: Never true     Ran Out of Food in the Last Year: Never true   Transportation Needs: No Transportation Needs (3/12/2025)    Received from [facility]    Transportation Needs     Lack of Transportation: No   Physical Activity: Insufficiently Active (1/9/2025)    Received from [facility]    Physical Activity     Days of Exercise per Week: 2 days     On average, how many minutes do you exercise per day?: 60     Total Minutes of Exercise Per Week: 120   Stress: No Stress Concern Present (3/12/2025)    Received from [facility]    Stress     Feeling of Stress : Not at all   Social Connections: Socially Integrated (3/12/2025)    Received from [facility]    Social Connections     Frequency of Communication with Friends and Family: More than three times a week     Frequency of Social Gatherings with Friends and Family: More than three times a week   Intimate Partner Violence: Not At Risk (3/12/2025)    Received from [facility]    Intimate Partner Violence     Fear of Current or Ex-Partner: No     Emotionally  impaired balance/decreased strength/pain/decreased ROM 35 yo  female  @ 39.1 wks reports decreased fetal movement since 12noon, not paying attention but felt occasional movement weaker than usual. denies vb or lof, reports occasional shavonne dan. AP course uncomplicated thus far. denies fever chills ha n/v/d new swelling vision changes epigastric pain cp sob or cough. last saw OB Monday, EFW 2 wks ago 6#4. pt reports has felt movement whilein triage.    GBS: negative  meds: PNV  All: denies  PMH: denies  PSH: breast sx , colonoscopic polypectomy 2020  gyn hx: denies  ob hx: MAB 2020 no sx    d/w Dr Abbasi discharge home resolved decreased fetal movement @ 39.1 wks  maternal and fetal surveillance reassuring  rest activity as tolerated  increase water intake  keep all OB appointments- today at 1015am  return for vaginal bleeding leaking of fluid decreased fetal movement or any concerns  labor precautions instructed  v/w discharge instructions given with verbal understanding by patient

## 2025-08-05 ENCOUNTER — NON-APPOINTMENT (OUTPATIENT)
Age: 40
End: 2025-08-05